# Patient Record
Sex: FEMALE | Race: BLACK OR AFRICAN AMERICAN | NOT HISPANIC OR LATINO | URBAN - METROPOLITAN AREA
[De-identification: names, ages, dates, MRNs, and addresses within clinical notes are randomized per-mention and may not be internally consistent; named-entity substitution may affect disease eponyms.]

---

## 2021-08-12 ENCOUNTER — EMERGENCY (EMERGENCY)
Facility: HOSPITAL | Age: 71
LOS: 1 days | Discharge: HOME | End: 2021-08-12
Attending: EMERGENCY MEDICINE | Admitting: EMERGENCY MEDICINE
Payer: MEDICARE

## 2021-08-12 VITALS
RESPIRATION RATE: 18 BRPM | HEART RATE: 70 BPM | OXYGEN SATURATION: 99 % | DIASTOLIC BLOOD PRESSURE: 77 MMHG | TEMPERATURE: 98 F | SYSTOLIC BLOOD PRESSURE: 130 MMHG

## 2021-08-12 VITALS
DIASTOLIC BLOOD PRESSURE: 74 MMHG | OXYGEN SATURATION: 98 % | RESPIRATION RATE: 18 BRPM | HEART RATE: 86 BPM | WEIGHT: 102.96 LBS | TEMPERATURE: 98 F | SYSTOLIC BLOOD PRESSURE: 131 MMHG

## 2021-08-12 DIAGNOSIS — R00.2 PALPITATIONS: ICD-10-CM

## 2021-08-12 DIAGNOSIS — R55 SYNCOPE AND COLLAPSE: ICD-10-CM

## 2021-08-12 DIAGNOSIS — Z85.3 PERSONAL HISTORY OF MALIGNANT NEOPLASM OF BREAST: ICD-10-CM

## 2021-08-12 DIAGNOSIS — E05.90 THYROTOXICOSIS, UNSPECIFIED WITHOUT THYROTOXIC CRISIS OR STORM: ICD-10-CM

## 2021-08-12 LAB
ALBUMIN SERPL ELPH-MCNC: 3.5 G/DL — SIGNIFICANT CHANGE UP (ref 3.4–5)
ALP SERPL-CCNC: 64 U/L — SIGNIFICANT CHANGE UP (ref 40–120)
ALT FLD-CCNC: 21 U/L — SIGNIFICANT CHANGE UP (ref 12–42)
ANION GAP SERPL CALC-SCNC: 7 MMOL/L — LOW (ref 9–16)
APPEARANCE UR: CLEAR — SIGNIFICANT CHANGE UP
AST SERPL-CCNC: 19 U/L — SIGNIFICANT CHANGE UP (ref 15–37)
BASOPHILS # BLD AUTO: 0.01 K/UL — SIGNIFICANT CHANGE UP (ref 0–0.2)
BASOPHILS NFR BLD AUTO: 0.2 % — SIGNIFICANT CHANGE UP (ref 0–2)
BILIRUB SERPL-MCNC: 0.2 MG/DL — SIGNIFICANT CHANGE UP (ref 0.2–1.2)
BILIRUB UR-MCNC: NEGATIVE — SIGNIFICANT CHANGE UP
BUN SERPL-MCNC: 19 MG/DL — SIGNIFICANT CHANGE UP (ref 7–23)
CALCIUM SERPL-MCNC: 9.2 MG/DL — SIGNIFICANT CHANGE UP (ref 8.5–10.5)
CHLORIDE SERPL-SCNC: 105 MMOL/L — SIGNIFICANT CHANGE UP (ref 96–108)
CO2 SERPL-SCNC: 28 MMOL/L — SIGNIFICANT CHANGE UP (ref 22–31)
COLOR SPEC: YELLOW — SIGNIFICANT CHANGE UP
CREAT SERPL-MCNC: 0.96 MG/DL — SIGNIFICANT CHANGE UP (ref 0.5–1.3)
DIFF PNL FLD: NEGATIVE — SIGNIFICANT CHANGE UP
EOSINOPHIL # BLD AUTO: 0.06 K/UL — SIGNIFICANT CHANGE UP (ref 0–0.5)
EOSINOPHIL NFR BLD AUTO: 1.1 % — SIGNIFICANT CHANGE UP (ref 0–6)
GLUCOSE SERPL-MCNC: 109 MG/DL — HIGH (ref 70–99)
GLUCOSE UR QL: NEGATIVE — SIGNIFICANT CHANGE UP
HCT VFR BLD CALC: 36.3 % — SIGNIFICANT CHANGE UP (ref 34.5–45)
HGB BLD-MCNC: 11.5 G/DL — SIGNIFICANT CHANGE UP (ref 11.5–15.5)
IMM GRANULOCYTES NFR BLD AUTO: 0.6 % — SIGNIFICANT CHANGE UP (ref 0–1.5)
KETONES UR-MCNC: NEGATIVE — SIGNIFICANT CHANGE UP
LEUKOCYTE ESTERASE UR-ACNC: NEGATIVE — SIGNIFICANT CHANGE UP
LYMPHOCYTES # BLD AUTO: 1.11 K/UL — SIGNIFICANT CHANGE UP (ref 1–3.3)
LYMPHOCYTES # BLD AUTO: 21.2 % — SIGNIFICANT CHANGE UP (ref 13–44)
MAGNESIUM SERPL-MCNC: 2.2 MG/DL — SIGNIFICANT CHANGE UP (ref 1.6–2.6)
MCHC RBC-ENTMCNC: 25.6 PG — LOW (ref 27–34)
MCHC RBC-ENTMCNC: 31.7 GM/DL — LOW (ref 32–36)
MCV RBC AUTO: 80.8 FL — SIGNIFICANT CHANGE UP (ref 80–100)
MONOCYTES # BLD AUTO: 0.7 K/UL — SIGNIFICANT CHANGE UP (ref 0–0.9)
MONOCYTES NFR BLD AUTO: 13.4 % — SIGNIFICANT CHANGE UP (ref 2–14)
NEUTROPHILS # BLD AUTO: 3.32 K/UL — SIGNIFICANT CHANGE UP (ref 1.8–7.4)
NEUTROPHILS NFR BLD AUTO: 63.5 % — SIGNIFICANT CHANGE UP (ref 43–77)
NITRITE UR-MCNC: NEGATIVE — SIGNIFICANT CHANGE UP
NRBC # BLD: 0 /100 WBCS — SIGNIFICANT CHANGE UP (ref 0–0)
PH UR: 5.5 — SIGNIFICANT CHANGE UP (ref 5–8)
PHOSPHATE SERPL-MCNC: 3.5 MG/DL — SIGNIFICANT CHANGE UP (ref 2.5–4.5)
PLATELET # BLD AUTO: 194 K/UL — SIGNIFICANT CHANGE UP (ref 150–400)
POTASSIUM SERPL-MCNC: 4.3 MMOL/L — SIGNIFICANT CHANGE UP (ref 3.5–5.3)
POTASSIUM SERPL-SCNC: 4.3 MMOL/L — SIGNIFICANT CHANGE UP (ref 3.5–5.3)
PROT SERPL-MCNC: 8.1 G/DL — SIGNIFICANT CHANGE UP (ref 6.4–8.2)
PROT UR-MCNC: NEGATIVE MG/DL — SIGNIFICANT CHANGE UP
RBC # BLD: 4.49 M/UL — SIGNIFICANT CHANGE UP (ref 3.8–5.2)
RBC # FLD: 15.5 % — HIGH (ref 10.3–14.5)
SODIUM SERPL-SCNC: 140 MMOL/L — SIGNIFICANT CHANGE UP (ref 132–145)
SP GR SPEC: <=1.005 — SIGNIFICANT CHANGE UP (ref 1–1.03)
TSH SERPL-MCNC: 0.15 UIU/ML — LOW (ref 0.36–3.74)
UROBILINOGEN FLD QL: 0.2 E.U./DL — SIGNIFICANT CHANGE UP
WBC # BLD: 5.23 K/UL — SIGNIFICANT CHANGE UP (ref 3.8–10.5)
WBC # FLD AUTO: 5.23 K/UL — SIGNIFICANT CHANGE UP (ref 3.8–10.5)

## 2021-08-12 PROCEDURE — 99284 EMERGENCY DEPT VISIT MOD MDM: CPT

## 2021-08-12 PROCEDURE — 93010 ELECTROCARDIOGRAM REPORT: CPT

## 2021-08-12 RX ORDER — SODIUM CHLORIDE 9 MG/ML
1000 INJECTION INTRAMUSCULAR; INTRAVENOUS; SUBCUTANEOUS ONCE
Refills: 0 | Status: COMPLETED | OUTPATIENT
Start: 2021-08-12 | End: 2021-08-12

## 2021-08-12 RX ADMIN — SODIUM CHLORIDE 1000 MILLILITER(S): 9 INJECTION INTRAMUSCULAR; INTRAVENOUS; SUBCUTANEOUS at 17:58

## 2021-08-12 NOTE — ED PROVIDER NOTE - WET READ LAUNCH FT
Returned call to pt. Pt received her EOB from her insurance and has not yet received the actual bill. She was not concerned with the charge but was upset that she was tested for illicit substances that she does not use. I explained to pt that this was the policy of the pain clinic that all new pts are tested and it was nothing personal.     There are no Wet Read(s) to document.

## 2021-08-12 NOTE — ED PROVIDER NOTE - NS ED ROS FT
GENERAL: No fever or chills  EYES: no change in vision  HEENT: no trouble swallowing or speaking  CARDIAC: no chest pain. + palpitations.   PULMONARY: no cough or SOB  GI: no abdominal pain, nausea, vomiting, diarrhea, or constipation   : No changes in urination  SKIN: no rashes  NEURO: no headache, numbness, or weakness. Presyncope.   MSK: No joint pain

## 2021-08-12 NOTE — ED PROVIDER NOTE - PATIENT PORTAL LINK FT
You can access the FollowMyHealth Patient Portal offered by [facility] by registering at the following website: [email or web address]. By joining [employer]’s FollowMyHealth portal, you will also be able to view your health information using other applications (apps) compatible with our system.

## 2021-08-12 NOTE — ED PROVIDER NOTE - PROGRESS NOTE DETAILS
TSH consistent with hyperthyroidism. Patient agrees to follow up with PCP. Also has an appointment with neurology on August 27th.

## 2021-08-12 NOTE — ED PROVIDER NOTE - CLINICAL SUMMARY MEDICAL DECISION MAKING FREE TEXT BOX
70 yo F with hx of breast cancer in 2008, resolved, RA presents with weakness, fatigue, pre-syncope and palpitations worsening for the last 2 months. VS WNL. EKG WNL. Normal physical exam. Possibly vasovagal pre-syncope / orthostatic hypotension. Less likely thyroid related. Do not suspect ACS given lack of chest pain /sob. Low heart score. Will obtain  basic labs, UA, TSH, and reassess. 70 yo F with hx of breast cancer in 2008, resolved, RA presents with weakness, fatigue, pre-syncope and palpitations worsening for the last 2 months. VS WNL. EKG WNL. Normal physical exam. Possibly vasovagal pre-syncope / orthostatic hypotension vs medication side effect. Less likely thyroid related. Do not suspect ACS given lack of chest pain /sob. Low heart score. Will obtain  basic labs, UA, TSH, and reassess.

## 2021-08-12 NOTE — ED ADULT TRIAGE NOTE - CHIEF COMPLAINT QUOTE
pt had an episode of palpitations, near syncope and general malaise - h/o Rheumatoid arthritis, breast ca

## 2021-08-12 NOTE — ED ADULT NURSE NOTE - NSIMPLEMENTINTERV_GEN_ALL_ED
Implemented All Universal Safety Interventions:  [place] to call system. Call bell, personal items and telephone within reach. Instruct patient to call for assistance. Room bathroom lighting operational. Non-slip footwear when patient is off stretcher. Physically safe environment: no spills, clutter or unnecessary equipment. Stretcher in lowest position, wheels locked, appropriate side rails in place.

## 2021-08-12 NOTE — ED PROVIDER NOTE - NSFOLLOWUPINSTRUCTIONS_ED_ALL_ED_FT
You were seen in the ED for lightheadedness, found to have signs of hyperthyroidism.   The following labs/imaging were obtained: see attached (if applicable)  Continue home medications (if any).   Stay well hydrated.   Return to the ED if you develop chest pain, shortness of breath, worsening or new concerning symptoms.  Follow up with your primary care in 2-3 days and discuss thyroid labs (See attached). Follow up with your neurologist on 08/27th.   Discussed with pt results of work up, strict return precautions, and need for follow up.  Pt expressed understanding and agrees with plan.

## 2021-08-12 NOTE — ED PROVIDER NOTE - ATTENDING CONTRIBUTION TO CARE
71 year old female came to the ED because of pre-syncope and palpitations on and off for 3 years, but worse in the last 3 months, has an upcoming neuro appointment. Normal neuro exam. Low TSH, but patient thinks she had abnormal thyroid test in past, that normalized on repeat testing. Improved with fluids, will dc to follow up with pmd. Precautions reviewed.

## 2021-08-12 NOTE — ED ADULT NURSE NOTE - OBJECTIVE STATEMENT
Pt came in c/o of intermittent palpitations, near syncope/fatigue x2018 with another bout occurring today. Pt reports fainting in 2018 and since then has become anxious that the is going to synopsize. PMH of left sided breat ca in remission since 2008. Denies fever,chills, sob, cp, n/v/d, headache/dizziness. A&Ox3 speaking in full sentences

## 2021-08-19 PROBLEM — Z78.9 OTHER SPECIFIED HEALTH STATUS: Chronic | Status: ACTIVE | Noted: 2021-08-12

## 2021-09-02 ENCOUNTER — APPOINTMENT (OUTPATIENT)
Dept: INTERNAL MEDICINE | Facility: CLINIC | Age: 71
End: 2021-09-02
Payer: MEDICARE

## 2021-09-02 ENCOUNTER — NON-APPOINTMENT (OUTPATIENT)
Age: 71
End: 2021-09-02

## 2021-09-02 VITALS
WEIGHT: 112 LBS | SYSTOLIC BLOOD PRESSURE: 106 MMHG | HEIGHT: 62.99 IN | DIASTOLIC BLOOD PRESSURE: 70 MMHG | HEART RATE: 76 BPM | OXYGEN SATURATION: 98 % | BODY MASS INDEX: 19.84 KG/M2

## 2021-09-02 DIAGNOSIS — Z82.49 FAMILY HISTORY OF ISCHEMIC HEART DISEASE AND OTHER DISEASES OF THE CIRCULATORY SYSTEM: ICD-10-CM

## 2021-09-02 DIAGNOSIS — Z78.9 OTHER SPECIFIED HEALTH STATUS: ICD-10-CM

## 2021-09-02 DIAGNOSIS — Z85.3 PERSONAL HISTORY OF MALIGNANT NEOPLASM OF BREAST: ICD-10-CM

## 2021-09-02 PROCEDURE — 93000 ELECTROCARDIOGRAM COMPLETE: CPT

## 2021-09-02 PROCEDURE — 99204 OFFICE O/P NEW MOD 45 MIN: CPT | Mod: 25

## 2021-09-02 RX ORDER — POLYVINYL ALCOHOL 14 MG/ML
1.4 SOLUTION/ DROPS OPHTHALMIC
Refills: 0 | Status: ACTIVE | COMMUNITY

## 2021-09-02 RX ORDER — MULTIVIT-MIN/FOLIC/VIT K/LYCOP 400-300MCG
500 TABLET ORAL
Refills: 0 | Status: ACTIVE | COMMUNITY

## 2021-09-03 NOTE — REVIEW OF SYSTEMS
[Fatigue] : fatigue [Joint Pain] : joint pain [Fever] : no fever [Night Sweats] : no night sweats [Discharge] : no discharge [Vision Problems] : no vision problems [Earache] : no earache [Nasal Discharge] : no nasal discharge [Chest Pain] : no chest pain [Palpitations] : no palpitations [Shortness Of Breath] : no shortness of breath [Cough] : no cough [Nausea] : no nausea [Vomiting] : no vomiting [Dysuria] : no dysuria [Hematuria] : no hematuria [Muscle Pain] : no muscle pain [Itching] : no itching [Headache] : no headache [Memory Loss] : no memory loss [Easy Bleeding] : no easy bleeding [Easy Bruising] : no easy bruising

## 2021-09-03 NOTE — PLAN
[FreeTextEntry1] : 45 min spent on 72 yo PMX, current hx, reviewing ER chart and labs, exam, plan and mgt.

## 2021-09-03 NOTE — HISTORY OF PRESENT ILLNESS
[de-identified] : F/U ER.  Had abn thyroid labs in ER.  Also on Plaquenil and pt states its just for hand arthritis.\par Was put on Fosamax in July.  Started having weakness, jaw pain and previous NP stopped them.  No nausea, vomiting, diarrhea, and constipation. No chest pain or shortness of breath.\par

## 2021-09-03 NOTE — HEALTH RISK ASSESSMENT
[Very Good] : ~his/her~  mood as very good [No] : No [No falls in past year] : Patient reported no falls in the past year [0] : 2) Feeling down, depressed, or hopeless: Not at all (0) [PHQ-2 Negative - No further assessment needed] : PHQ-2 Negative - No further assessment needed [Patient reported mammogram was normal] : Patient reported mammogram was normal [Patient reported colonoscopy was normal] : Patient reported colonoscopy was normal [None] : None [Alone] : lives alone [Retired] : retired [Single] : single [# Of Children ___] : has [unfilled] children [Fully functional (bathing, dressing, toileting, transferring, walking, feeding)] : Fully functional (bathing, dressing, toileting, transferring, walking, feeding) [Fully functional (using the telephone, shopping, preparing meals, housekeeping, doing laundry, using] : Fully functional and needs no help or supervision to perform IADLs (using the telephone, shopping, preparing meals, housekeeping, doing laundry, using transportation, managing medications and managing finances) [] : No [Change in mental status noted] : No change in mental status noted [MammogramDate] : 12/20 [ColonoscopyDate] : 01/2019

## 2021-09-04 ENCOUNTER — TRANSCRIPTION ENCOUNTER (OUTPATIENT)
Age: 71
End: 2021-09-04

## 2021-09-07 LAB
ALBUMIN SERPL ELPH-MCNC: 4.1 G/DL
ALP BLD-CCNC: 64 U/L
ALT SERPL-CCNC: 13 U/L
ANA PAT FLD IF-IMP: ABNORMAL
ANA SER IF-ACNC: ABNORMAL
ANION GAP SERPL CALC-SCNC: 11 MMOL/L
APPEARANCE: CLEAR
AST SERPL-CCNC: 22 U/L
BASOPHILS # BLD AUTO: 0.02 K/UL
BASOPHILS NFR BLD AUTO: 0.4 %
BILIRUB SERPL-MCNC: 0.2 MG/DL
BILIRUBIN URINE: NEGATIVE
BLOOD URINE: NEGATIVE
BUN SERPL-MCNC: 16 MG/DL
CALCIUM SERPL-MCNC: 9.6 MG/DL
CHLORIDE SERPL-SCNC: 103 MMOL/L
CHOLEST SERPL-MCNC: 184 MG/DL
CK SERPL-CCNC: 85 U/L
CO2 SERPL-SCNC: 27 MMOL/L
COLOR: NORMAL
CREAT SERPL-MCNC: 1.02 MG/DL
CRP SERPL-MCNC: <3 MG/L
EOSINOPHIL # BLD AUTO: 0.12 K/UL
EOSINOPHIL NFR BLD AUTO: 2.3 %
ERYTHROCYTE [SEDIMENTATION RATE] IN BLOOD BY WESTERGREN METHOD: 37 MM/HR
FERRITIN SERPL-MCNC: 75 NG/ML
FOLATE SERPL-MCNC: >20 NG/ML
GLUCOSE QUALITATIVE U: NEGATIVE
GLUCOSE SERPL-MCNC: 84 MG/DL
HCT VFR BLD CALC: 39.3 %
HDLC SERPL-MCNC: 65 MG/DL
HGB BLD-MCNC: 12.1 G/DL
IMM GRANULOCYTES NFR BLD AUTO: 0.4 %
KETONES URINE: NEGATIVE
LDLC SERPL CALC-MCNC: 107 MG/DL
LEUKOCYTE ESTERASE URINE: NEGATIVE
LYMPHOCYTES # BLD AUTO: 1.16 K/UL
LYMPHOCYTES NFR BLD AUTO: 22.6 %
MAN DIFF?: NORMAL
MCHC RBC-ENTMCNC: 26.1 PG
MCHC RBC-ENTMCNC: 30.8 GM/DL
MCV RBC AUTO: 84.7 FL
MONOCYTES # BLD AUTO: 0.51 K/UL
MONOCYTES NFR BLD AUTO: 9.9 %
NEUTROPHILS # BLD AUTO: 3.31 K/UL
NEUTROPHILS NFR BLD AUTO: 64.4 %
NITRITE URINE: NEGATIVE
NONHDLC SERPL-MCNC: 119 MG/DL
PH URINE: 7.5
PLATELET # BLD AUTO: 196 K/UL
POTASSIUM SERPL-SCNC: 4.2 MMOL/L
PROT SERPL-MCNC: 7.3 G/DL
PROTEIN URINE: NORMAL
RBC # BLD: 4.64 M/UL
RBC # FLD: 15.6 %
RHEUMATOID FACT SER QL: <10 IU/ML
SODIUM SERPL-SCNC: 141 MMOL/L
SPECIFIC GRAVITY URINE: 1.02
T3 SERPL-MCNC: 64 NG/DL
T4 FREE SERPL-MCNC: 0.5 NG/DL
THYROGLOB AB SERPL-ACNC: 1897 IU/ML
THYROPEROXIDASE AB SERPL IA-ACNC: 3853 IU/ML
TRIGL SERPL-MCNC: 61 MG/DL
TSH SERPL-ACNC: 40.3 UIU/ML
TSI ACT/NOR SER: <0.1 IU/L
UROBILINOGEN URINE: NORMAL
WBC # FLD AUTO: 5.14 K/UL

## 2021-10-07 ENCOUNTER — APPOINTMENT (OUTPATIENT)
Dept: ENDOCRINOLOGY | Facility: CLINIC | Age: 71
End: 2021-10-07
Payer: MEDICARE

## 2021-10-07 VITALS
WEIGHT: 112 LBS | DIASTOLIC BLOOD PRESSURE: 72 MMHG | HEART RATE: 71 BPM | SYSTOLIC BLOOD PRESSURE: 111 MMHG | HEIGHT: 62.99 IN | TEMPERATURE: 97.6 F | OXYGEN SATURATION: 99 % | BODY MASS INDEX: 19.84 KG/M2

## 2021-10-07 PROCEDURE — 99204 OFFICE O/P NEW MOD 45 MIN: CPT

## 2021-10-08 LAB
25(OH)D3 SERPL-MCNC: 41.8 NG/ML
ALBUMIN SERPL ELPH-MCNC: 4.9 G/DL
CALCIUM SERPL-MCNC: 10.2 MG/DL
CALCIUM SERPL-MCNC: 10.2 MG/DL
MAGNESIUM SERPL-MCNC: 2.1 MG/DL
PARATHYROID HORMONE INTACT: 18 PG/ML
PHOSPHATE SERPL-MCNC: 4.2 MG/DL
T3 SERPL-MCNC: 75 NG/DL
T4 FREE SERPL-MCNC: 1.2 NG/DL
TSH SERPL-ACNC: 3.04 UIU/ML

## 2021-10-11 ENCOUNTER — RX RENEWAL (OUTPATIENT)
Age: 71
End: 2021-10-11

## 2021-10-15 ENCOUNTER — NON-APPOINTMENT (OUTPATIENT)
Age: 71
End: 2021-10-15

## 2021-10-18 ENCOUNTER — NON-APPOINTMENT (OUTPATIENT)
Age: 71
End: 2021-10-18

## 2021-10-21 ENCOUNTER — NON-APPOINTMENT (OUTPATIENT)
Age: 71
End: 2021-10-21

## 2021-10-23 ENCOUNTER — NON-APPOINTMENT (OUTPATIENT)
Age: 71
End: 2021-10-23

## 2021-10-24 ENCOUNTER — NON-APPOINTMENT (OUTPATIENT)
Age: 71
End: 2021-10-24

## 2021-10-28 ENCOUNTER — NON-APPOINTMENT (OUTPATIENT)
Age: 71
End: 2021-10-28

## 2021-10-28 ENCOUNTER — APPOINTMENT (OUTPATIENT)
Dept: OBGYN | Facility: CLINIC | Age: 71
End: 2021-10-28
Payer: MEDICARE

## 2021-10-28 VITALS
WEIGHT: 114 LBS | BODY MASS INDEX: 20.2 KG/M2 | HEIGHT: 63 IN | DIASTOLIC BLOOD PRESSURE: 90 MMHG | SYSTOLIC BLOOD PRESSURE: 120 MMHG

## 2021-10-28 DIAGNOSIS — Z01.419 ENCOUNTER FOR GYNECOLOGICAL EXAMINATION (GENERAL) (ROUTINE) W/OUT ABNORMAL FINDINGS: ICD-10-CM

## 2021-10-28 DIAGNOSIS — Z12.39 ENCOUNTER FOR OTHER SCREENING FOR MALIGNANT NEOPLASM OF BREAST: ICD-10-CM

## 2021-10-28 DIAGNOSIS — Z85.3 PERSONAL HISTORY OF MALIGNANT NEOPLASM OF BREAST: ICD-10-CM

## 2021-10-28 DIAGNOSIS — Z80.1 FAMILY HISTORY OF MALIGNANT NEOPLASM OF TRACHEA, BRONCHUS AND LUNG: ICD-10-CM

## 2021-10-28 PROCEDURE — 99397 PER PM REEVAL EST PAT 65+ YR: CPT

## 2021-10-28 NOTE — COUNSELING
[Vitamins/Supplements] : vitamins/supplements [Influenza Vaccine] : influenza vaccine [Medication Management] : medication management

## 2021-10-28 NOTE — HISTORY OF PRESENT ILLNESS
[Patient reported mammogram was normal] : Patient reported mammogram was normal [Patient reported PAP Smear was normal] : Patient reported PAP Smear was normal [Patient reported colonoscopy was normal] : Patient reported colonoscopy was normal [postmenopausal] : postmenopausal [N] : Patient is not sexually active [Y] : Positive pregnancy history [Menarche Age: ____] : age at menarche was [unfilled] [Post-Menopause, No Sxs] : post-menopausal, currently without symptoms [Menopause Age: ____] : age at menopause was [unfilled] [Previously active] : previously active [Men] : men [No] : Patient does not have concerns regarding sex [Patient refuses STI testing] : Patient refuses STI testing [TextBox_4] : 71 yr old female presents for Annual [employer]. Feeling well. Denies any GYN complaints today. Denies any PMB. Denies any hx of abnormal paps in the past. Patient has a hx of breast cancer in Left breast; 2008. Patient had a lumpectomy and completed radiation. \par Patient denies any family hx of breast ovarian or colon cancer.  [Mammogramdate] : 2020 [PapSmeardate] : 2019 [BoneDensityDate] : 4/2021 [TextBox_37] : osteoporosis [ColonoscopyDate] : 2019 [PGHxTotal] : 3 [PGHxAbortions] : 3

## 2021-10-28 NOTE — REVIEW OF SYSTEMS
[Joint Stiffness] : joint stiffness [Negative] : Breast [FreeTextEntry9] : arthritis ; follows endo and rheum [de-identified] : hypothyroidism; sees endocrine

## 2021-10-28 NOTE — PHYSICAL EXAM
[Appropriately responsive] : appropriately responsive [Alert] : alert [No Acute Distress] : no acute distress [No Lymphadenopathy] : no lymphadenopathy [Soft] : soft [Non-tender] : non-tender [Non-distended] : non-distended [No Lesions] : no lesions [No Mass] : no mass [Oriented x3] : oriented x3 [Vulvar Atrophy] : vulvar atrophy [Labia Majora] : normal [Labia Minora] : normal [Atrophy] : atrophy [Uterine Adnexae] : normal [Declined] : Patient declined rectal exam [Examination Of The Breasts] : a normal appearance [Normal] : normal [No Masses] : no breast masses were palpable

## 2021-10-28 NOTE — PLAN
[FreeTextEntry1] : Pap performed as per patient request\par Patient to have previous GYN records sent to office for review\par Discussed vaginal atrophy. Patient declines any medicinal intervention at this time. \par Rx given for Mammo/Sono of breasts and TVS. \par

## 2021-11-01 LAB — HPV HIGH+LOW RISK DNA PNL CVX: NOT DETECTED

## 2021-11-02 LAB — CYTOLOGY CVX/VAG DOC THIN PREP: ABNORMAL

## 2021-11-18 ENCOUNTER — APPOINTMENT (OUTPATIENT)
Dept: ENDOCRINOLOGY | Facility: CLINIC | Age: 71
End: 2021-11-18
Payer: MEDICARE

## 2021-11-18 VITALS
BODY MASS INDEX: 20.2 KG/M2 | HEART RATE: 78 BPM | SYSTOLIC BLOOD PRESSURE: 134 MMHG | WEIGHT: 114 LBS | OXYGEN SATURATION: 98 % | TEMPERATURE: 97 F | DIASTOLIC BLOOD PRESSURE: 75 MMHG | HEIGHT: 63 IN

## 2021-11-18 DIAGNOSIS — M19.049 PRIMARY OSTEOARTHRITIS, UNSPECIFIED HAND: ICD-10-CM

## 2021-11-18 PROCEDURE — 99214 OFFICE O/P EST MOD 30 MIN: CPT

## 2021-11-18 RX ADMIN — ZOLEDRONIC ACID 0 MG/100ML: 5 INJECTION, SOLUTION INTRAVENOUS at 00:00

## 2021-11-19 LAB
25(OH)D3 SERPL-MCNC: 43.4 NG/ML
ALBUMIN SERPL ELPH-MCNC: 4.4 G/DL
CALCIUM SERPL-MCNC: 9.2 MG/DL
CREAT SERPL-MCNC: 0.86 MG/DL
TSH SERPL-ACNC: 1.58 UIU/ML

## 2021-11-22 ENCOUNTER — NON-APPOINTMENT (OUTPATIENT)
Age: 71
End: 2021-11-22

## 2021-12-07 ENCOUNTER — NON-APPOINTMENT (OUTPATIENT)
Age: 71
End: 2021-12-07

## 2021-12-07 ENCOUNTER — APPOINTMENT (OUTPATIENT)
Dept: RHEUMATOLOGY | Facility: CLINIC | Age: 71
End: 2021-12-07
Payer: MEDICARE

## 2021-12-07 VITALS
WEIGHT: 114 LBS | HEART RATE: 65 BPM | SYSTOLIC BLOOD PRESSURE: 112 MMHG | OXYGEN SATURATION: 100 % | TEMPERATURE: 98.1 F | BODY MASS INDEX: 20.2 KG/M2 | DIASTOLIC BLOOD PRESSURE: 73 MMHG | HEIGHT: 63 IN

## 2021-12-07 PROCEDURE — 99204 OFFICE O/P NEW MOD 45 MIN: CPT

## 2021-12-07 NOTE — HISTORY OF PRESENT ILLNESS
[FreeTextEntry1] : 71 year old woman referred to establish rheumatology care.\par \par Patient with onset of hand pain in May 2021, following with Dr. Shiloh Morejon at [place]. \par Patient initially with bilateral hand pain.  Presented with swelling of the bilateral hands, hard to make a fist, hard to open jars or open her door with key.  \par Taking hydroxychloroquine since August 2021 with good response, no pain at present\par Patient also with dry eyes\par \par Patient walks for exercise on track\par Also walks doing errands\par Rec center near home just joined in hopes of using the bicycle\par History of osteoporosis, started on ibandronate, had side effects of jaw pain, discontinues, had xray of the mandible 8/2021 which was negative \par Evaluated by endocrinology, planning for reclast when insurance approves\par No history of previous fracture\par \par Father with arthritis, RA\par No family history of hip fracture\par \par No photosensitivity\par Uses artificial tears four times per day. Follows with ophthalmologist\par Follows with retina doctor, was seen by retina specialist\par No rash \par \par Review of labs on patient's phone\par LEIGHTON +\par SSa+\par RF negative\par CCP negative\par \par May 2021 \par ESR 74\par CRP 4.26\par \par July 2021\par CRP 50\par ESR 87\par \par September 2021\par ESR 37,\par CRP<0.3

## 2021-12-07 NOTE — REVIEW OF SYSTEMS
[Dry Eyes] : dryness of the eyes [Negative] : Heme/Lymph [FreeTextEntry3] : uses artificial tears four times per day in each eye [FreeTextEntry9] : No joint pains at present

## 2021-12-07 NOTE — ASSESSMENT
[FreeTextEntry1] : Inflammatory polyarthritis predominantly of the hands, nonerosive, noted to have positive LEIGHTON, Positive SSa, negative RF, negative CCP.  Patient also with dry eyes, followed closely by ophthalmologist, treated with artificial tears in eye eye four times per day with benefit, possible early Sjogren's syndrome.  Patient with good response to plaquenil 200 mg daily, as pain and swelling resolved and inflammatory markers much improved, now taking once per day and will continue at this this dose. Opthalmology for retinal monitoring up to date.  Osteoporosis management as per endocrine, will increase weight bearing exercises, continue calcium and vitamin D supplementation, and fall precautions discussed. Will follow up in three months or sooner if symptoms change or worsen.

## 2021-12-07 NOTE — PHYSICAL EXAM
[General Appearance - Alert] : alert [General Appearance - In No Acute Distress] : in no acute distress [General Appearance - Well-Appearing] : healthy appearing [Sclera] : the sclera and conjunctiva were normal [Examination Of The Oral Cavity] : the lips and gums were normal [Oropharynx] : the oropharynx was normal [Respiration, Rhythm And Depth] : normal respiratory rhythm and effort [Exaggerated Use Of Accessory Muscles For Inspiration] : no accessory muscle use [No Spinal Tenderness] : no spinal tenderness [Abnormal Walk] : normal gait [Nail Clubbing] : no clubbing  or cyanosis of the fingernails [Musculoskeletal - Swelling] : no joint swelling seen [] : no rash [Oriented To Time, Place, And Person] : oriented to person, place, and time [Impaired Insight] : insight and judgment were intact [Affect] : the affect was normal [FreeTextEntry1] : moist mucous membranes

## 2021-12-07 NOTE — DATA REVIEWED
[FreeTextEntry1] : DEXA 4/2021\par Lumbar spine R score -3.3\par femoral neck: -1.3\par \par Dexa 2016\par lumbar spine -1.8\par femoral neck -1.2

## 2021-12-15 ENCOUNTER — TRANSCRIPTION ENCOUNTER (OUTPATIENT)
Age: 71
End: 2021-12-15

## 2021-12-22 ENCOUNTER — NON-APPOINTMENT (OUTPATIENT)
Age: 71
End: 2021-12-22

## 2022-01-06 ENCOUNTER — APPOINTMENT (OUTPATIENT)
Dept: RHEUMATOLOGY | Facility: CLINIC | Age: 72
End: 2022-01-06
Payer: MEDICARE

## 2022-01-06 VITALS
TEMPERATURE: 98.1 F | OXYGEN SATURATION: 99 % | HEART RATE: 64 BPM | SYSTOLIC BLOOD PRESSURE: 114 MMHG | DIASTOLIC BLOOD PRESSURE: 73 MMHG | RESPIRATION RATE: 14 BRPM

## 2022-01-06 VITALS
TEMPERATURE: 98.1 F | HEART RATE: 67 BPM | OXYGEN SATURATION: 99 % | SYSTOLIC BLOOD PRESSURE: 123 MMHG | RESPIRATION RATE: 14 BRPM | DIASTOLIC BLOOD PRESSURE: 77 MMHG

## 2022-01-06 PROCEDURE — 96374 THER/PROPH/DIAG INJ IV PUSH: CPT

## 2022-01-06 RX ORDER — ZOLEDRONIC ACID 5 MG/100ML
5 INJECTION INTRAVENOUS
Qty: 0 | Refills: 0 | Status: COMPLETED | OUTPATIENT
Start: 2021-11-18

## 2022-01-18 ENCOUNTER — APPOINTMENT (OUTPATIENT)
Dept: DERMATOLOGY | Facility: CLINIC | Age: 72
End: 2022-01-18
Payer: MEDICARE

## 2022-01-18 ENCOUNTER — NON-APPOINTMENT (OUTPATIENT)
Age: 72
End: 2022-01-18

## 2022-01-18 DIAGNOSIS — L81.0 POSTINFLAMMATORY HYPERPIGMENTATION: ICD-10-CM

## 2022-01-18 PROCEDURE — 99203 OFFICE O/P NEW LOW 30 MIN: CPT

## 2022-02-17 ENCOUNTER — APPOINTMENT (OUTPATIENT)
Dept: ENDOCRINOLOGY | Facility: CLINIC | Age: 72
End: 2022-02-17
Payer: MEDICARE

## 2022-02-17 VITALS
SYSTOLIC BLOOD PRESSURE: 109 MMHG | BODY MASS INDEX: 20.2 KG/M2 | DIASTOLIC BLOOD PRESSURE: 73 MMHG | HEIGHT: 63 IN | OXYGEN SATURATION: 100 % | HEART RATE: 75 BPM | TEMPERATURE: 97.9 F | WEIGHT: 114 LBS

## 2022-02-17 PROCEDURE — 99214 OFFICE O/P EST MOD 30 MIN: CPT

## 2022-02-18 LAB
25(OH)D3 SERPL-MCNC: 43.2 NG/ML
ALBUMIN SERPL ELPH-MCNC: 4.4 G/DL
CALCIUM SERPL-MCNC: 9.4 MG/DL
TSH SERPL-ACNC: 1.35 UIU/ML

## 2022-02-18 RX ORDER — LEVOTHYROXINE SODIUM 50 UG/1
50 TABLET ORAL
Qty: 30 | Refills: 1 | Status: COMPLETED | COMMUNITY
Start: 2021-09-07 | End: 2022-02-18

## 2022-02-25 ENCOUNTER — NON-APPOINTMENT (OUTPATIENT)
Age: 72
End: 2022-02-25

## 2022-03-07 ENCOUNTER — APPOINTMENT (OUTPATIENT)
Dept: RHEUMATOLOGY | Facility: CLINIC | Age: 72
End: 2022-03-07
Payer: MEDICARE

## 2022-03-07 VITALS
HEIGHT: 63 IN | DIASTOLIC BLOOD PRESSURE: 61 MMHG | HEART RATE: 80 BPM | WEIGHT: 113 LBS | SYSTOLIC BLOOD PRESSURE: 97 MMHG | TEMPERATURE: 97.9 F | OXYGEN SATURATION: 98 % | BODY MASS INDEX: 20.02 KG/M2

## 2022-03-07 PROCEDURE — 99213 OFFICE O/P EST LOW 20 MIN: CPT | Mod: 25

## 2022-03-07 NOTE — HISTORY OF PRESENT ILLNESS
[FreeTextEntry1] : 72 year old woman returns for follow up rheumatology evaluation.\par \par December 7, 2021\par Patient with onset of hand pain in May 2021, following with Dr. Shiloh Morejon at [place]. \par Patient initially with bilateral hand pain.  Presented with swelling of the bilateral hands, hard to make a fist, hard to open jars or open her door with key.  \par Taking hydroxychloroquine since August 2021 with good response, no pain at present\par Patient also with dry eyes\par \par Patient walks for exercise on track\par Also walks doing errands\par Rec center near home just joined in hopes of using the bicycle\par History of osteoporosis, started on ibandronate, had side effects of jaw pain, discontinues, had xray of the mandible 8/2021 which was negative \par Evaluated by endocrinology, planning for reclast when insurance approves\par No history of previous fracture\par \par Father with arthritis, RA\par No family history of hip fracture\par \par No photosensitivity\par Uses artificial tears four times per day. Follows with ophthalmologist\par Follows with retina doctor, was seen by retina specialist\par No rash \par \par Review of labs on patient's phone\par LEIGHTON +\par SSa+\par RF negative\par CCP negative\par \par May 2021 \par ESR 74\par CRP 4.26\par \par July 2021\par CRP 50\par ESR 87\par \par September 2021\par ESR 37,\par CRP<0.3\par \par March 7, 2022\par Patient returns for follow up\par Spoke with patient 2/25/2022, patient was concerned about feeling tired and fatigue at that time, and was concerned about possible side effect to plaquenil\par Patient stopped taking medications 2/26/2022 and since that time, feels some improvement in dull headache and maybe some benefit in fatigue\par No pain in the hands, no stiffness or swelling recurred since stopping plaquenil, was taking once daily dosing.\par thyroid function in the normal range at this time.\par No chest pain or shortness of breath\par Does feel some dizziness which has not changed with holding plaquenil.\par

## 2022-03-07 NOTE — PHYSICAL EXAM
[General Appearance - Alert] : alert [General Appearance - In No Acute Distress] : in no acute distress [General Appearance - Well-Appearing] : healthy appearing [Sclera] : the sclera and conjunctiva were normal [] : no respiratory distress [Respiration, Rhythm And Depth] : normal respiratory rhythm and effort [Exaggerated Use Of Accessory Muscles For Inspiration] : no accessory muscle use [Edema] : there was no peripheral edema [Abnormal Walk] : normal gait [Nail Clubbing] : no clubbing  or cyanosis of the fingernails [Musculoskeletal - Swelling] : no joint swelling seen [Motor Tone] : muscle strength and tone were normal [Oriented To Time, Place, And Person] : oriented to person, place, and time [Impaired Insight] : insight and judgment were intact [Affect] : the affect was normal [FreeTextEntry1] : No active synovitis of the upper and lower extremities bilaterally.

## 2022-03-07 NOTE — ASSESSMENT
[FreeTextEntry1] : Inflammatory polyarthritis predominantly of the hands, nonerosive, noted to have positive LEIGHTON, Positive SSa, negative RF, negative CCP.  Patient also with dry eyes, followed closely by ophthalmologist, treated with artificial tears in eye eye four times per day with benefit, possible early Sjogren's syndrome.  Patient with good response to plaquenil 200 mg daily, as pain and swelling resolved and inflammatory markers much improved, recently held plaquenil due to patient's concern regarding possible side effects of fatigue. Some benefit in fatigue since stopping without increase in joint symptoms since that time. Will continue to hold for now, if symptoms recur, will discuss possible restarting every other day or starting alternative therapy such as methotrexate.  Ophthalmology for retinal monitoring up to date.  Osteoporosis management as per endocrine, s/p reclast in January 2022, will continue weight bearing exercises, continue calcium and vitamin D supplementation, and fall precautions discussed. Will follow up in one month to reassess of plaquenill.  WIll check CBC today for further evaluation.

## 2022-03-08 LAB
BASOPHILS # BLD AUTO: 0.02 K/UL
BASOPHILS NFR BLD AUTO: 0.3 %
EOSINOPHIL # BLD AUTO: 0.11 K/UL
EOSINOPHIL NFR BLD AUTO: 1.8 %
HCT VFR BLD CALC: 39 %
HGB BLD-MCNC: 12.1 G/DL
IMM GRANULOCYTES NFR BLD AUTO: 0.2 %
LYMPHOCYTES # BLD AUTO: 1.29 K/UL
LYMPHOCYTES NFR BLD AUTO: 21 %
MAN DIFF?: NORMAL
MCHC RBC-ENTMCNC: 25.7 PG
MCHC RBC-ENTMCNC: 31 GM/DL
MCV RBC AUTO: 83 FL
MONOCYTES # BLD AUTO: 0.58 K/UL
MONOCYTES NFR BLD AUTO: 9.5 %
NEUTROPHILS # BLD AUTO: 4.12 K/UL
NEUTROPHILS NFR BLD AUTO: 67.2 %
PLATELET # BLD AUTO: 167 K/UL
RBC # BLD: 4.7 M/UL
RBC # FLD: 14.8 %
WBC # FLD AUTO: 6.13 K/UL

## 2022-04-06 ENCOUNTER — LABORATORY RESULT (OUTPATIENT)
Age: 72
End: 2022-04-06

## 2022-04-06 ENCOUNTER — APPOINTMENT (OUTPATIENT)
Dept: RHEUMATOLOGY | Facility: CLINIC | Age: 72
End: 2022-04-06
Payer: MEDICARE

## 2022-04-06 VITALS
OXYGEN SATURATION: 96 % | DIASTOLIC BLOOD PRESSURE: 78 MMHG | BODY MASS INDEX: 20.38 KG/M2 | TEMPERATURE: 97.8 F | HEART RATE: 88 BPM | SYSTOLIC BLOOD PRESSURE: 127 MMHG | HEIGHT: 63 IN | WEIGHT: 115 LBS

## 2022-04-06 PROCEDURE — 99214 OFFICE O/P EST MOD 30 MIN: CPT | Mod: 25

## 2022-04-06 RX ORDER — HYDROXYCHLOROQUINE SULFATE 200 MG/1
200 TABLET, FILM COATED ORAL DAILY
Qty: 90 | Refills: 3 | Status: DISCONTINUED | COMMUNITY
End: 2022-04-06

## 2022-04-07 ENCOUNTER — APPOINTMENT (OUTPATIENT)
Dept: INTERNAL MEDICINE | Facility: CLINIC | Age: 72
End: 2022-04-07
Payer: MEDICARE

## 2022-04-07 ENCOUNTER — NON-APPOINTMENT (OUTPATIENT)
Age: 72
End: 2022-04-07

## 2022-04-07 VITALS
HEIGHT: 63 IN | BODY MASS INDEX: 20.02 KG/M2 | DIASTOLIC BLOOD PRESSURE: 71 MMHG | HEART RATE: 87 BPM | TEMPERATURE: 97.5 F | WEIGHT: 113 LBS | SYSTOLIC BLOOD PRESSURE: 108 MMHG | OXYGEN SATURATION: 98 %

## 2022-04-07 DIAGNOSIS — R42 DIZZINESS AND GIDDINESS: ICD-10-CM

## 2022-04-07 LAB
ALBUMIN SERPL ELPH-MCNC: 4.5 G/DL
ALP BLD-CCNC: 55 U/L
ALT SERPL-CCNC: 13 U/L
ANION GAP SERPL CALC-SCNC: 12 MMOL/L
AST SERPL-CCNC: 24 U/L
BILIRUB SERPL-MCNC: 0.4 MG/DL
BUN SERPL-MCNC: 18 MG/DL
CALCIUM SERPL-MCNC: 9.7 MG/DL
CHLORIDE SERPL-SCNC: 105 MMOL/L
CK SERPL-CCNC: 75 U/L
CO2 SERPL-SCNC: 26 MMOL/L
CREAT SERPL-MCNC: 0.84 MG/DL
CRP SERPL-MCNC: <3 MG/L
EGFR: 75 ML/MIN/1.73M2
GLUCOSE SERPL-MCNC: 88 MG/DL
POTASSIUM SERPL-SCNC: 4 MMOL/L
PROT SERPL-MCNC: 7.6 G/DL
SODIUM SERPL-SCNC: 142 MMOL/L

## 2022-04-07 PROCEDURE — 93000 ELECTROCARDIOGRAM COMPLETE: CPT

## 2022-04-07 PROCEDURE — 99214 OFFICE O/P EST MOD 30 MIN: CPT | Mod: 25

## 2022-04-07 NOTE — PHYSICAL EXAM
[General Appearance - Alert] : alert [General Appearance - In No Acute Distress] : in no acute distress [General Appearance - Well-Appearing] : healthy appearing [Sclera] : the sclera and conjunctiva were normal [Examination Of The Oral Cavity] : the lips and gums were normal [Oropharynx] : the oropharynx was normal [Respiration, Rhythm And Depth] : normal respiratory rhythm and effort [Exaggerated Use Of Accessory Muscles For Inspiration] : no accessory muscle use [Edema] : there was no peripheral edema [Abnormal Walk] : normal gait [Nail Clubbing] : no clubbing  or cyanosis of the fingernails [Musculoskeletal - Swelling] : no joint swelling seen [Motor Tone] : muscle strength and tone were normal [] : no rash [Oriented To Time, Place, And Person] : oriented to person, place, and time [Impaired Insight] : insight and judgment were intact [Affect] : the affect was normal [Mood] : the mood was normal [FreeTextEntry1] : No active synovitis of the upper and lower extremities bilaterally.

## 2022-04-07 NOTE — REVIEW OF SYSTEMS
[Feeling Poorly] : feeling poorly [Shortness Of Breath] : shortness of breath [Dizziness] : dizziness [Negative] : Heme/Lymph [Fainting] : no fainting

## 2022-04-07 NOTE — ASSESSMENT
[FreeTextEntry1] : Inflammatory polyarthritis predominantly of the hands, nonerosive, noted to have positive LEIGHTON, Positive SSa, negative RF, negative CCP.  Patient also with dry eyes, followed closely by ophthalmologist, treated with artificial tears in eye eye four times per day with benefit, possible early Sjogren's syndrome.  Patient with good response to plaquenil 200 mg daily, as pain and swelling resolved and inflammatory markers much improved, medications held due to patient's concern regarding possible side effects of fatigue. Some benefit in fatigue since stopping without increase in joint symptoms since that time. Will continue to hold for now, if symptoms recur, will discuss possible restarting every other day or starting alternative therapy such as methotrexate.  Ophthalmology for retinal monitoring up to date.  Osteoporosis management as per endocrine, s/p reclast in January 2022, patient concerned about possible side effects of reclast as has been feeling weakness and dizziness since the infusion.  Patient jemima see PMD tomorrow for further evaluation. Discussed possible cardiology evaluation as symptoms make have worsened with exercise recently, but patient would like to discuss with PMD first. Discussed continuing weight bearing exercises, continue calcium and vitamin D supplementation, and fall precautions discussed. Will follow up in two months to reassess off plaquenill.  Will check CMP, CRP, CK and SPEP prior to tomorrow's visit.

## 2022-04-07 NOTE — HISTORY OF PRESENT ILLNESS
[FreeTextEntry1] : 72 year old woman returns for follow up rheumatology evaluation.\par \par December 7, 2021\par Patient with onset of hand pain in May 2021, following with Dr. Shiloh Morejon at [place]. \par Patient initially with bilateral hand pain.  Presented with swelling of the bilateral hands, hard to make a fist, hard to open jars or open her door with key.  \par Taking hydroxychloroquine since August 2021 with good response, no pain at present\par Patient also with dry eyes\par \par Patient walks for exercise on track\par Also walks doing errands\par Rec center near home just joined in hopes of using the bicycle\par History of osteoporosis, started on ibandronate, had side effects of jaw pain, discontinues, had xray of the mandible 8/2021 which was negative \par Evaluated by endocrinology, planning for Reclast when insurance approves\par No history of previous fracture\par \par Father with arthritis, RA\par No family history of hip fracture\par \par No photosensitivity\par Uses artificial tears four times per day. Follows with ophthalmologist\par Follows with retina doctor, was seen by retina specialist\par No rash \par \par Review of labs on patient's phone\par LEIGHTON +\par SSa+\par RF negative\par CCP negative\par \par May 2021 \par ESR 74\par CRP 4.26\par \par July 2021\par CRP 50\par ESR 87\par \par September 2021\par ESR 37,\par CRP<0.3\par \par March 7, 2022\par Patient returns for follow up\par Spoke with patient 2/25/2022, patient was concerned about feeling tired and fatigue at that time, and was concerned about ppossible side effect to plaquenil\par Patient stopped taking medications 2/26/2022 and since that time, feels some improvement in dull headache and maybe some benefit in fatigue\par No pain in the hands, no stiffness or swelling recurred since stopping plaquenil, was taking once daily dosing.\par thyroid function in the normal range at this time.\par No chest pain or shortness of breath\par Does feel some dizziness which has not changed with holding plaquenil.\par \par April 6, 2022\par Patient returns for follow up\par Patient has continued off plaquenil without an increase in joint pains or stiffness\par No pain or swelling at present, previously present in the bilateral hands\par Patient wishes to continue off plaquenil at this time.\par Main concern for patient related to feelings of dizziness and weakness\par Feels may be related to reclast infusion in January 2022\par Reviewed side effects information this morning regarding reclast and feels her symptoms correspond to these symptoms\par Sometimes feels as though may faint or pass out, but able to sit or use an umbrella for balance\par Feels stays well hydrated\par Taking multivitamin and vitamin C\par No changes in thyroid medicine\par Otherwise feeling well\par Has appointment with PMD tomorrow\par Discussed possible cardiology evaluation as also occurred after exercising

## 2022-04-11 LAB
ALBUMIN MFR SERPL ELPH: 56.2 %
ALBUMIN SERPL-MCNC: 4.3 G/DL
ALBUMIN/GLOB SERPL: 1.3 RATIO
ALPHA1 GLOB MFR SERPL ELPH: 3.5 %
ALPHA1 GLOB SERPL ELPH-MCNC: 0.3 G/DL
ALPHA2 GLOB MFR SERPL ELPH: 10.4 %
ALPHA2 GLOB SERPL ELPH-MCNC: 0.8 G/DL
B-GLOBULIN MFR SERPL ELPH: 11.8 %
B-GLOBULIN SERPL ELPH-MCNC: 0.9 G/DL
GAMMA GLOB FLD ELPH-MCNC: 1.4 G/DL
GAMMA GLOB MFR SERPL ELPH: 18.1 %
INTERPRETATION SERPL IEP-IMP: NORMAL
PROT SERPL-MCNC: 7.6 G/DL
PROT SERPL-MCNC: 7.6 G/DL

## 2022-04-11 NOTE — HISTORY OF PRESENT ILLNESS
[FreeTextEntry1] : lightheaded [de-identified] : Has been sluggish and lightheadedness.  Feels weaker dante getting up from chair.  No CP/SOB/HAINES.  No fever.  Pt has been fatigued and taking a vacation from [place] x 1 month.  No change in fatigue.  S/P Reclast 1/6/22.

## 2022-04-11 NOTE — PHYSICAL EXAM
[No Acute Distress] : no acute distress [Normal Sclera/Conjunctiva] : normal sclera/conjunctiva [EOMI] : extraocular movements intact [No JVD] : no jugular venous distention [No Lymphadenopathy] : no lymphadenopathy [Supple] : supple [No Respiratory Distress] : no respiratory distress  [Clear to Auscultation] : lungs were clear to auscultation bilaterally [Normal S1, S2] : normal S1 and S2 [No Murmur] : no murmur heard [No Carotid Bruits] : no carotid bruits [No Abdominal Bruit] : a ~M bruit was not heard ~T in the abdomen [Soft] : abdomen soft [Non Tender] : non-tender [Coordination Grossly Intact] : coordination grossly intact [Normal Gait] : normal gait [Deep Tendon Reflexes (DTR)] : deep tendon reflexes were 2+ and symmetric [Speech Grossly Normal] : speech grossly normal [Normal Mood] : the mood was normal [de-identified] : lying 116/66  78  Standing  102/60  88

## 2022-04-20 ENCOUNTER — NON-APPOINTMENT (OUTPATIENT)
Age: 72
End: 2022-04-20

## 2022-04-20 ENCOUNTER — APPOINTMENT (OUTPATIENT)
Dept: INTERNAL MEDICINE | Facility: CLINIC | Age: 72
End: 2022-04-20
Payer: MEDICARE

## 2022-04-20 VITALS
HEIGHT: 63 IN | DIASTOLIC BLOOD PRESSURE: 78 MMHG | BODY MASS INDEX: 20.2 KG/M2 | SYSTOLIC BLOOD PRESSURE: 124 MMHG | HEART RATE: 82 BPM | OXYGEN SATURATION: 97 % | WEIGHT: 114 LBS | TEMPERATURE: 97.6 F

## 2022-04-20 DIAGNOSIS — R53.83 OTHER MALAISE: ICD-10-CM

## 2022-04-20 DIAGNOSIS — R53.81 OTHER MALAISE: ICD-10-CM

## 2022-04-20 PROCEDURE — 93000 ELECTROCARDIOGRAM COMPLETE: CPT

## 2022-04-20 PROCEDURE — 99214 OFFICE O/P EST MOD 30 MIN: CPT | Mod: 25

## 2022-04-22 NOTE — REVIEW OF SYSTEMS
[Fever] : no fever [Night Sweats] : no night sweats [Earache] : no earache [Nasal Discharge] : no nasal discharge [Chest Pain] : no chest pain [Palpitations] : no palpitations [Shortness Of Breath] : no shortness of breath [Cough] : no cough [Nausea] : no nausea [Vomiting] : no vomiting

## 2022-04-22 NOTE — HISTORY OF PRESENT ILLNESS
[FreeTextEntry1] : lightheaded [de-identified] : Pt with symptoms when going from lying or sitting to standing position.  Has been taking BP in both positions at home and mostly dropping BP.  Then feels weak and lightheaded.  No nausea, vomiting, diarrhea, and constipation. No chest pain or shortness of breath.\par

## 2022-04-22 NOTE — PHYSICAL EXAM
[No Acute Distress] : no acute distress [Well Nourished] : well nourished [No Lymphadenopathy] : no lymphadenopathy [Supple] : supple [No Respiratory Distress] : no respiratory distress  [Clear to Auscultation] : lungs were clear to auscultation bilaterally [Regular Rhythm] : with a regular rhythm [No Murmur] : no murmur heard [Soft] : abdomen soft [Non Tender] : non-tender [Coordination Grossly Intact] : coordination grossly intact [No Focal Deficits] : no focal deficits [Speech Grossly Normal] : speech grossly normal [Normal Mood] : the mood was normal [de-identified] : 118/72 lying and 124/76 standing

## 2022-05-03 ENCOUNTER — RESULT REVIEW (OUTPATIENT)
Age: 72
End: 2022-05-03

## 2022-05-03 ENCOUNTER — OUTPATIENT (OUTPATIENT)
Dept: OUTPATIENT SERVICES | Facility: HOSPITAL | Age: 72
LOS: 1 days | End: 2022-05-03

## 2022-05-03 ENCOUNTER — APPOINTMENT (OUTPATIENT)
Dept: CT IMAGING | Facility: CLINIC | Age: 72
End: 2022-05-03
Payer: MEDICARE

## 2022-05-03 PROCEDURE — 70450 CT HEAD/BRAIN W/O DYE: CPT | Mod: 26

## 2022-05-04 ENCOUNTER — APPOINTMENT (OUTPATIENT)
Dept: INTERNAL MEDICINE | Facility: CLINIC | Age: 72
End: 2022-05-04
Payer: MEDICARE

## 2022-05-04 VITALS
WEIGHT: 116 LBS | SYSTOLIC BLOOD PRESSURE: 133 MMHG | HEART RATE: 78 BPM | HEIGHT: 63 IN | DIASTOLIC BLOOD PRESSURE: 75 MMHG | OXYGEN SATURATION: 99 % | BODY MASS INDEX: 20.55 KG/M2

## 2022-05-04 LAB
ALBUMIN SERPL ELPH-MCNC: 4.3 G/DL
ALP BLD-CCNC: 58 U/L
ALT SERPL-CCNC: 8 U/L
ANION GAP SERPL CALC-SCNC: 11 MMOL/L
APPEARANCE: CLEAR
AST SERPL-CCNC: 15 U/L
BACTERIA UR CULT: NORMAL
BASOPHILS # BLD AUTO: 0.02 K/UL
BASOPHILS NFR BLD AUTO: 0.4 %
BILIRUB SERPL-MCNC: 0.2 MG/DL
BILIRUBIN URINE: NEGATIVE
BLOOD URINE: NEGATIVE
BUN SERPL-MCNC: 16 MG/DL
CALCIUM SERPL-MCNC: 9.8 MG/DL
CHLORIDE SERPL-SCNC: 104 MMOL/L
CO2 SERPL-SCNC: 26 MMOL/L
COLOR: NORMAL
CREAT SERPL-MCNC: 0.82 MG/DL
EGFR: 77 ML/MIN/1.73M2
EOSINOPHIL # BLD AUTO: 0.08 K/UL
EOSINOPHIL NFR BLD AUTO: 1.6 %
ERYTHROCYTE [SEDIMENTATION RATE] IN BLOOD BY WESTERGREN METHOD: 96 MM/HR
FERRITIN SERPL-MCNC: 98 NG/ML
FOLATE SERPL-MCNC: >20 NG/ML
GLUCOSE QUALITATIVE U: NEGATIVE
GLUCOSE SERPL-MCNC: 82 MG/DL
HCT VFR BLD CALC: 37.9 %
HGB BLD-MCNC: 11.8 G/DL
IMM GRANULOCYTES NFR BLD AUTO: 0.2 %
KETONES URINE: NEGATIVE
LEUKOCYTE ESTERASE URINE: NEGATIVE
LYMPHOCYTES # BLD AUTO: 1.17 K/UL
LYMPHOCYTES NFR BLD AUTO: 23.6 %
MAN DIFF?: NORMAL
MCHC RBC-ENTMCNC: 26 PG
MCHC RBC-ENTMCNC: 31.1 GM/DL
MCV RBC AUTO: 83.7 FL
MONOCYTES # BLD AUTO: 0.48 K/UL
MONOCYTES NFR BLD AUTO: 9.7 %
NEUTROPHILS # BLD AUTO: 3.2 K/UL
NEUTROPHILS NFR BLD AUTO: 64.5 %
NITRITE URINE: NEGATIVE
PH URINE: 7
PLATELET # BLD AUTO: 178 K/UL
POTASSIUM SERPL-SCNC: 4.1 MMOL/L
PROT SERPL-MCNC: 7.7 G/DL
PROTEIN URINE: NEGATIVE
RBC # BLD: 4.53 M/UL
RBC # FLD: 14.6 %
SODIUM SERPL-SCNC: 141 MMOL/L
SPECIFIC GRAVITY URINE: 1.01
TSH SERPL-ACNC: 1.78 UIU/ML
UROBILINOGEN URINE: NORMAL
VIT B12 SERPL-MCNC: 1306 PG/ML
WBC # FLD AUTO: 4.96 K/UL

## 2022-05-04 PROCEDURE — 99214 OFFICE O/P EST MOD 30 MIN: CPT

## 2022-05-06 NOTE — PHYSICAL EXAM
[No Acute Distress] : no acute distress [Well Nourished] : well nourished [Normal Sclera/Conjunctiva] : normal sclera/conjunctiva [EOMI] : extraocular movements intact [No Lymphadenopathy] : no lymphadenopathy [Supple] : supple [No Respiratory Distress] : no respiratory distress  [Clear to Auscultation] : lungs were clear to auscultation bilaterally [Regular Rhythm] : with a regular rhythm [No Murmur] : no murmur heard [Soft] : abdomen soft [Non Tender] : non-tender [Coordination Grossly Intact] : coordination grossly intact [No Focal Deficits] : no focal deficits [Speech Grossly Normal] : speech grossly normal [Normal Mood] : the mood was normal

## 2022-05-06 NOTE — HISTORY OF PRESENT ILLNESS
[FreeTextEntry1] : Lightheadedness [de-identified] : CT head yesterday.  Slightly better.  Mildly better with BP.  Taking in more sodium.  No CP/SOB.  No N/V/D/C.  Pt stopped taking Plaquenil after having Fatigue.  ESR now up again.

## 2022-05-12 ENCOUNTER — APPOINTMENT (OUTPATIENT)
Dept: ENDOCRINOLOGY | Facility: CLINIC | Age: 72
End: 2022-05-12

## 2022-05-17 ENCOUNTER — TRANSCRIPTION ENCOUNTER (OUTPATIENT)
Age: 72
End: 2022-05-17

## 2022-05-17 ENCOUNTER — NON-APPOINTMENT (OUTPATIENT)
Age: 72
End: 2022-05-17

## 2022-06-01 ENCOUNTER — APPOINTMENT (OUTPATIENT)
Dept: INTERNAL MEDICINE | Facility: CLINIC | Age: 72
End: 2022-06-01
Payer: MEDICARE

## 2022-06-01 VITALS
WEIGHT: 115 LBS | DIASTOLIC BLOOD PRESSURE: 69 MMHG | HEART RATE: 84 BPM | HEIGHT: 63 IN | RESPIRATION RATE: 16 BRPM | TEMPERATURE: 97.3 F | OXYGEN SATURATION: 96 % | SYSTOLIC BLOOD PRESSURE: 116 MMHG | BODY MASS INDEX: 20.38 KG/M2

## 2022-06-01 PROCEDURE — 99214 OFFICE O/P EST MOD 30 MIN: CPT

## 2022-06-02 NOTE — REVIEW OF SYSTEMS
[Fever] : no fever [Night Sweats] : no night sweats [Chest Pain] : no chest pain [Palpitations] : no palpitations [Shortness Of Breath] : no shortness of breath [Cough] : no cough [Nausea] : no nausea [Vomiting] : no vomiting [Headache] : no headache [Memory Loss] : no memory loss

## 2022-06-02 NOTE — PHYSICAL EXAM
[No Acute Distress] : no acute distress [Well Nourished] : well nourished [No Lymphadenopathy] : no lymphadenopathy [Supple] : supple [No Respiratory Distress] : no respiratory distress  [Clear to Auscultation] : lungs were clear to auscultation bilaterally [Regular Rhythm] : with a regular rhythm [Normal S1, S2] : normal S1 and S2 [Soft] : abdomen soft [Non Tender] : non-tender [Coordination Grossly Intact] : coordination grossly intact [Normal Gait] : normal gait [Speech Grossly Normal] : speech grossly normal [Normal Mood] : the mood was normal

## 2022-06-02 NOTE — HISTORY OF PRESENT ILLNESS
[FreeTextEntry1] : Dizziness. [de-identified] : Dizziness is improving with extra Sodium and home remedy of a 2 tbsp lemon juice.  Also using decaf Green Tea when stressed and HR up.  Still getting up and down more slowly.  Overall feeling better.  No nausea, vomiting, diarrhea, and constipation. No chest pain or shortness of breath.\par

## 2022-06-08 ENCOUNTER — APPOINTMENT (OUTPATIENT)
Dept: RHEUMATOLOGY | Facility: CLINIC | Age: 72
End: 2022-06-08
Payer: MEDICARE

## 2022-06-08 VITALS
BODY MASS INDEX: 20.55 KG/M2 | HEART RATE: 90 BPM | WEIGHT: 116 LBS | OXYGEN SATURATION: 95 % | TEMPERATURE: 98 F | DIASTOLIC BLOOD PRESSURE: 64 MMHG | HEIGHT: 63 IN | SYSTOLIC BLOOD PRESSURE: 100 MMHG

## 2022-06-08 PROCEDURE — 99214 OFFICE O/P EST MOD 30 MIN: CPT

## 2022-06-08 NOTE — HISTORY OF PRESENT ILLNESS
[FreeTextEntry1] : 72 year old woman returns for follow up rheumatology evaluation.\par \par December 7, 2021\par Patient with onset of hand pain in May 2021, following with Dr. Shiloh Morejon at [place]. \par Patient initially with bilateral hand pain.  Presented with swelling of the bilateral hands, hard to make a fist, hard to open jars or open her door with key.  \par Taking hydroxychloroquine since August 2021 with good response, no pain at present\par Patient also with dry eyes\par \par Patient walks for exercise on track\par Also walks doing errands\par Rec center near home just joined in hopes of using the bicycle\par History of osteoporosis, started on ibandronate, had side effects of jaw pain, discontinues, had xray of the mandible 8/2021 which was negative \par Evaluated by endocrinology, planning for Reclast when insurance approves\par No history of previous fracture\par \par Father with arthritis, RA\par No family history of hip fracture\par \par No photosensitivity\par Uses artificial tears four times per day. Follows with ophthalmologist\par Follows with retina doctor, was seen by retina specialist\par No rash \par \par Review of labs on patient's phone\par LEIGHTON +\par SSa+\par RF negative\par CCP negative\par \par May 2021 \par ESR 74\par CRP 4.26\par \par July 2021\par CRP 50\par ESR 87\par \par September 2021\par ESR 37,\par CRP<0.3\par \par March 7, 2022\par Patient returns for follow up\par Spoke with patient 2/25/2022, patient was concerned about feeling tired and fatigue at that time, and was concerned about possible side effect to plaquenil\par Patient stopped taking medications 2/26/2022 and since that time, feels some improvement in dull headache and maybe some benefit in fatigue\par No pain in the hands, no stiffness or swelling recurred since stopping plaquenil, was taking once daily dosing.\par thyroid function in the normal range at this time.\par No chest pain or shortness of breath\par Does feel some dizziness which has not changed with holding plaquenil.\par \par April 6, 2022\par Patient returns for follow up\par Patient has continued off plaquenil without an increase in joint pains or stiffness\par No pain or swelling at present, previously present in the bilateral hands\par Patient wishes to continue off plaquenil at this time.\par Main concern for patient related to feelings of dizziness and weakness\par Feels may be related to reclast infusion in January 2022\par Reviewed side effects information this morning regarding reclast and feels her symptoms correspond to these symptoms\par Sometimes feels as though may faint or pass out, but able to sit or use an umbrella for balance\par Feels stays well hydrated\par Taking multivitamin and vitamin C\par No changes in thyroid medicine\par Otherwise feeling well\par Has appointment with PMD tomorrow\par Discussed possible cardiology evaluation as also occurred after exercising\par \par June 8, 2022\par Patient returns for follow up\par Feeling better in terms of dizziness since last visit although still present\par Feels benefit from decaf green tea before bed, has been drinking for his week with good response\par Has some increase in joint pain and stiffness, noted to have elevated ESR by PMD\par Was seen by opthalmology recently as well\par Using lidocaine patches for pain, previously tried voltaren gel and di not feel benefit\par Discussed trial of plaquenil 200 mg every other day for oow and if develops fatigue again, will discontinue\par

## 2022-06-08 NOTE — ASSESSMENT
[FreeTextEntry1] : Inflammatory polyarthritis predominantly of the hands, nonerosive, noted to have positive LEIGHTON, Positive SSa, negative RF, negative CCP.  Patient also with dry eyes, followed closely by ophthalmologist, treated with artificial tears in eye eye four times per day with benefit, possible early Sjogren's syndrome.  Patient with good response to plaquenil 200 mg daily, as pain and swelling resolved and inflammatory markers much improved, medications held due to patient's concern regarding possible side effects of fatigue. Some benefit in fatigue since stopping however now with increase in symptoms and ESR elevation.  Discussed trial of restarting plaquenil 200 mg every other day for now, with goal to increase to daily dosing as tolerated.  Ophthalmology for retinal monitoring up to date.  Osteoporosis management as per endocrine, s/p reclast in January 2022, patient concerned about possible side effects of reclast as has been feeling weakness and dizziness since the infusion, slowly improving. Discussed continuing weight bearing exercises, continue calcium and vitamin D supplementation, and fall precautions discussed. Will follow up in six weeks to reassess after restarting plaquenill.

## 2022-06-08 NOTE — PHYSICAL EXAM
[General Appearance - Alert] : alert [General Appearance - In No Acute Distress] : in no acute distress [General Appearance - Well-Appearing] : healthy appearing [Sclera] : the sclera and conjunctiva were normal [Respiration, Rhythm And Depth] : normal respiratory rhythm and effort [Exaggerated Use Of Accessory Muscles For Inspiration] : no accessory muscle use [Edema] : there was no peripheral edema [Abnormal Walk] : normal gait [Nail Clubbing] : no clubbing  or cyanosis of the fingernails [Musculoskeletal - Swelling] : no joint swelling seen [] : no rash [Oriented To Time, Place, And Person] : oriented to person, place, and time [Impaired Insight] : insight and judgment were intact [Affect] : the affect was normal [FreeTextEntry1] : Pain with handgrip bilaterally.  Some discomfort with range of motion of the shoulders

## 2022-06-08 NOTE — REVIEW OF SYSTEMS
[Joint Pain] : joint pain [Joint Stiffness] : joint stiffness [Dizziness] : dizziness [Negative] : Heme/Lymph [Joint Swelling] : no joint swelling [de-identified] : improving

## 2022-06-10 ENCOUNTER — NON-APPOINTMENT (OUTPATIENT)
Age: 72
End: 2022-06-10

## 2022-07-14 ENCOUNTER — APPOINTMENT (OUTPATIENT)
Dept: ENDOCRINOLOGY | Facility: CLINIC | Age: 72
End: 2022-07-14

## 2022-07-14 VITALS
WEIGHT: 114 LBS | SYSTOLIC BLOOD PRESSURE: 115 MMHG | TEMPERATURE: 97.6 F | OXYGEN SATURATION: 98 % | BODY MASS INDEX: 20.2 KG/M2 | DIASTOLIC BLOOD PRESSURE: 73 MMHG | HEIGHT: 63 IN | HEART RATE: 87 BPM

## 2022-07-14 PROCEDURE — 99214 OFFICE O/P EST MOD 30 MIN: CPT

## 2022-07-15 LAB
25(OH)D3 SERPL-MCNC: 47.5 NG/ML
ALBUMIN SERPL ELPH-MCNC: 4.7 G/DL
CALCIUM SERPL-MCNC: 9.9 MG/DL
CREAT SERPL-MCNC: 0.93 MG/DL
CRP SERPL-MCNC: <3 MG/L
EGFR: 66 ML/MIN/1.73M2
PHOSPHATE SERPL-MCNC: 3.3 MG/DL
TSH SERPL-ACNC: 0.95 UIU/ML

## 2022-07-18 ENCOUNTER — NON-APPOINTMENT (OUTPATIENT)
Age: 72
End: 2022-07-18

## 2022-07-20 ENCOUNTER — APPOINTMENT (OUTPATIENT)
Dept: RHEUMATOLOGY | Facility: CLINIC | Age: 72
End: 2022-07-20

## 2022-07-20 PROCEDURE — 99441: CPT

## 2022-07-20 NOTE — ASSESSMENT
[FreeTextEntry1] : Inflammatory polyarthritis predominantly of the hands, nonerosive, noted to have positive LEIGHTON, Positive SSa, negative RF, negative CCP.  Patient also with dry eyes, followed closely by ophthalmologist, treated with artificial tears in eye eye four times per day with benefit, possible early Sjogren's syndrome.  Since last visit patient has restarted hydroxychloroquine 200 mg, four days/week with good response in terms of joint pain and fatigue.  Patient does not report any side effects at this time. Ophthalmology for retinal monitoring up to date. Osteoporosis management as per endocrine, s/p reclast in January 2022, patient concerned about possible side effects of reclast as has been feeling weakness and dizziness since the infusion, slowly improving.  Patient also following with endocrine for hypothyroidism, recently adjusted levothyroxine dose.  Discussed continuing weight bearing exercises, continue calcium and vitamin D supplementation, and fall precautions discussed. Will follow up in 3-4 months or sooner as needed.
Yes

## 2022-07-20 NOTE — HISTORY OF PRESENT ILLNESS
[FreeTextEntry1] : 72 year old woman returns for follow up rheumatology evaluation.\par \par December 7, 2021\par Patient with onset of hand pain in May 2021, following with Dr. Shiloh Morejon at [place]. \par Patient initially with bilateral hand pain.  Presented with swelling of the bilateral hands, hard to make a fist, hard to open jars or open her door with key.  \par Taking hydroxychloroquine since August 2021 with good response, no pain at present\par Patient also with dry eyes\par \par Patient walks for exercise on track\par Also walks doing errands\par Rec center near home just joined in hopes of using the bicycle\par History of osteoporosis, started on ibandronate, had side effects of jaw pain, discontinues, had xray of the mandible 8/2021 which was negative \par Evaluated by endocrinology, planning for Reclast when insurance approves\par No history of previous fracture\par \par Father with arthritis, RA\par No family history of hip fracture\par \par No photosensitivity\par Uses artificial tears four times per day. Follows with ophthalmologist\par Follows with retina doctor, was seen by retina specialist\par No rash \par \par Review of labs on patient's phone\par LEIGHTON +\par SSa+\par RF negative\par CCP negative\par \par May 2021 \par ESR 74\par CRP 4.26\par \par July 2021\par CRP 50\par ESR 87\par \par September 2021\par ESR 37,\par CRP<0.3\par \par March 7, 2022\par Patient returns for follow up\par Spoke with patient 2/25/2022, patient was concerned about feeling tired and fatigue at that time, and was concerned about possible side effect to plaquenil\par Patient stopped taking medications 2/26/2022 and since that time, feels some improvement in dull headache and maybe some benefit in fatigue\par No pain in the hands, no stiffness or swelling recurred since stopping plaquenil, was taking once daily dosing.\par thyroid function in the normal range at this time.\par No chest pain or shortness of breath\par Does feel some dizziness which has not changed with holding plaquenil.\par \par April 6, 2022\par Patient returns for follow up\par Patient has continued off plaquenil without an increase in joint pains or stiffness\par No pain or swelling at present, previously present in the bilateral hands\par Patient wishes to continue off plaquenil at this time.\par Main concern for patient related to feelings of dizziness and weakness\par Feels may be related to reclast infusion in January 2022\par Reviewed side effects information this morning regarding reclast and feels her symptoms correspond to these symptoms\par Sometimes feels as though may faint or pass out, but able to sit or use an umbrella for balance\par Feels stays well hydrated\par Taking multivitamin and vitamin C\par No changes in thyroid medicine\par Otherwise feeling well\par Has appointment with PMD tomorrow\par Discussed possible cardiology evaluation as also occurred after exercising\par \par June 8, 2022\par Patient returns for follow up\par Feeling better in terms of dizziness since last visit although still present\par Feels benefit from decaf green tea before bed, has been drinking for his week with good response\par Has some increase in joint pain and stiffness, noted to have elevated ESR by PMD\par Was seen by opthalmology recently as well\par Using lidocaine patches for pain, previously tried voltaren gel and di not feel benefit\par Discussed trial of plaquenil 200 mg every other day for now and if develops fatigue again, will discontinue\par \par July 20, 2022\par Patient returns for follow up via telehealth visit.\par Discussed with patient.  You have chosen to receive care through the use of tele-media.  Telemedia enables health care providers at different locations to provide safe, effective, and convenient care through the use of technology.  Please note this is a billable encounter.  Patient understands that I cannot perform a physical exam and that patient may need to come to the clinic to complete the assessment.  Patient agreed verbally to understanding the risks and benefits of telemedia as explained.\par \par Patient has been taking plaquenil about four times per week since last visit.\par "So far so good"\par Feels less foggy than before and feels less pain in the fingers\par No stiffness in the hands noted, maybe one finger intermittently \par Better than before\par Next ophthalmology appointment scheduled for 12/13/202\par \par PMD appointmetn 12/5\par

## 2022-07-22 ENCOUNTER — EMERGENCY (EMERGENCY)
Facility: HOSPITAL | Age: 72
LOS: 1 days | Discharge: HOME | End: 2022-07-22
Attending: EMERGENCY MEDICINE | Admitting: EMERGENCY MEDICINE

## 2022-07-22 VITALS
HEART RATE: 81 BPM | RESPIRATION RATE: 17 BRPM | SYSTOLIC BLOOD PRESSURE: 126 MMHG | TEMPERATURE: 98 F | OXYGEN SATURATION: 97 % | DIASTOLIC BLOOD PRESSURE: 76 MMHG

## 2022-07-22 LAB
ALBUMIN SERPL ELPH-MCNC: 3.6 G/DL — SIGNIFICANT CHANGE UP (ref 3.4–5)
ALP SERPL-CCNC: 58 U/L — SIGNIFICANT CHANGE UP (ref 40–120)
ALT FLD-CCNC: 20 U/L — SIGNIFICANT CHANGE UP (ref 12–42)
ANION GAP SERPL CALC-SCNC: 8 MMOL/L — LOW (ref 9–16)
AST SERPL-CCNC: 22 U/L — SIGNIFICANT CHANGE UP (ref 15–37)
BASOPHILS # BLD AUTO: 0.01 K/UL — SIGNIFICANT CHANGE UP (ref 0–0.2)
BASOPHILS NFR BLD AUTO: 0.2 % — SIGNIFICANT CHANGE UP (ref 0–2)
BILIRUB SERPL-MCNC: 0.3 MG/DL — SIGNIFICANT CHANGE UP (ref 0.2–1.2)
BUN SERPL-MCNC: 15 MG/DL — SIGNIFICANT CHANGE UP (ref 7–23)
CALCIUM SERPL-MCNC: 9 MG/DL — SIGNIFICANT CHANGE UP (ref 8.5–10.5)
CHLORIDE SERPL-SCNC: 107 MMOL/L — SIGNIFICANT CHANGE UP (ref 96–108)
CO2 SERPL-SCNC: 27 MMOL/L — SIGNIFICANT CHANGE UP (ref 22–31)
CREAT SERPL-MCNC: 0.97 MG/DL — SIGNIFICANT CHANGE UP (ref 0.5–1.3)
EGFR: 63 ML/MIN/1.73M2 — SIGNIFICANT CHANGE UP
EOSINOPHIL # BLD AUTO: 0.1 K/UL — SIGNIFICANT CHANGE UP (ref 0–0.5)
EOSINOPHIL NFR BLD AUTO: 2 % — SIGNIFICANT CHANGE UP (ref 0–6)
GLUCOSE SERPL-MCNC: 94 MG/DL — SIGNIFICANT CHANGE UP (ref 70–99)
HCT VFR BLD CALC: 35.5 % — SIGNIFICANT CHANGE UP (ref 34.5–45)
HGB BLD-MCNC: 11.4 G/DL — LOW (ref 11.5–15.5)
IMM GRANULOCYTES NFR BLD AUTO: 0.2 % — SIGNIFICANT CHANGE UP (ref 0–1.5)
LYMPHOCYTES # BLD AUTO: 1.28 K/UL — SIGNIFICANT CHANGE UP (ref 1–3.3)
LYMPHOCYTES # BLD AUTO: 25.8 % — SIGNIFICANT CHANGE UP (ref 13–44)
MCHC RBC-ENTMCNC: 26.3 PG — LOW (ref 27–34)
MCHC RBC-ENTMCNC: 32.1 GM/DL — SIGNIFICANT CHANGE UP (ref 32–36)
MCV RBC AUTO: 82 FL — SIGNIFICANT CHANGE UP (ref 80–100)
MONOCYTES # BLD AUTO: 0.63 K/UL — SIGNIFICANT CHANGE UP (ref 0–0.9)
MONOCYTES NFR BLD AUTO: 12.7 % — SIGNIFICANT CHANGE UP (ref 2–14)
NEUTROPHILS # BLD AUTO: 2.94 K/UL — SIGNIFICANT CHANGE UP (ref 1.8–7.4)
NEUTROPHILS NFR BLD AUTO: 59.1 % — SIGNIFICANT CHANGE UP (ref 43–77)
NRBC # BLD: 0 /100 WBCS — SIGNIFICANT CHANGE UP (ref 0–0)
PLATELET # BLD AUTO: 154 K/UL — SIGNIFICANT CHANGE UP (ref 150–400)
POTASSIUM SERPL-MCNC: 3.9 MMOL/L — SIGNIFICANT CHANGE UP (ref 3.5–5.3)
POTASSIUM SERPL-SCNC: 3.9 MMOL/L — SIGNIFICANT CHANGE UP (ref 3.5–5.3)
PROT SERPL-MCNC: 8.1 G/DL — SIGNIFICANT CHANGE UP (ref 6.4–8.2)
RBC # BLD: 4.33 M/UL — SIGNIFICANT CHANGE UP (ref 3.8–5.2)
RBC # FLD: 14.2 % — SIGNIFICANT CHANGE UP (ref 10.3–14.5)
SODIUM SERPL-SCNC: 142 MMOL/L — SIGNIFICANT CHANGE UP (ref 132–145)
WBC # BLD: 4.97 K/UL — SIGNIFICANT CHANGE UP (ref 3.8–10.5)
WBC # FLD AUTO: 4.97 K/UL — SIGNIFICANT CHANGE UP (ref 3.8–10.5)

## 2022-07-22 PROCEDURE — 99284 EMERGENCY DEPT VISIT MOD MDM: CPT

## 2022-07-22 NOTE — ED PROVIDER NOTE - PATIENT PORTAL LINK FT
You can access the FollowMyHealth Patient Portal offered by [facility] by registering at the following website: [email or web address]. By joining [facility]’s FollowMyHealth portal, you will also be able to view your health information using other applications (apps) compatible with our system.

## 2022-07-22 NOTE — ED ADULT TRIAGE NOTE - CHIEF COMPLAINT QUOTE
Pt c/o hypertension x2 months. Pt states she has seen her PCP three times over the past month. Pt CP or dizziness.

## 2022-07-22 NOTE — ED PROVIDER NOTE - CARE PLAN
1 Principal Discharge DX:	History of chronic fatigue  Assessment and plan of treatment:	ddx includes metabolic derangement, dehydration, anemia amongst other considerations  will check basic labs  bp wnl in ED   Principal Discharge DX:	History of chronic fatigue  Assessment and plan of treatment:	ddx includes metabolic derangement, dehydration, anemia amongst other considerations  will check basic labs  bp wnl in ED    labs wnl   advise pmd f/u for chronic fatigue   emergent precautions noted - intractable cp, sob, difficulty breathing, lightheadedness/dizziness or other concerning symptoms

## 2022-07-22 NOTE — ED PROVIDER NOTE - OBJECTIVE STATEMENT
72F  pmh hypothyroid, RA  presents for concern of high bp on home reading  states home readings have been erratic over the last several months - sometimes hypertensive, sometimes hypotensive   c/o 5-6 months of general lethargy   has pmd f/u scheduled next week

## 2022-07-22 NOTE — ED PROVIDER NOTE - CLINICAL SUMMARY MEDICAL DECISION MAKING FREE TEXT BOX
ddx includes metabolic derangement, dehydration, anemia amongst other considerations  will check basic labs  bp wnl in ED ddx includes metabolic derangement, dehydration, anemia amongst other considerations  will check basic labs  bp wnl in ED    labs wnl   advise pmd f/u for chronic fatigue   emergent precautions noted - intractable cp, sob, difficulty breathing, lightheadedness/dizziness or other concerning symptoms

## 2022-07-25 DIAGNOSIS — I10 ESSENTIAL (PRIMARY) HYPERTENSION: ICD-10-CM

## 2022-07-25 DIAGNOSIS — R53.82 CHRONIC FATIGUE, UNSPECIFIED: ICD-10-CM

## 2022-08-09 ENCOUNTER — APPOINTMENT (OUTPATIENT)
Dept: INTERNAL MEDICINE | Facility: CLINIC | Age: 72
End: 2022-08-09

## 2022-08-09 VITALS
SYSTOLIC BLOOD PRESSURE: 105 MMHG | OXYGEN SATURATION: 98 % | HEART RATE: 80 BPM | WEIGHT: 116 LBS | BODY MASS INDEX: 20.55 KG/M2 | HEIGHT: 63 IN | TEMPERATURE: 97.5 F | DIASTOLIC BLOOD PRESSURE: 67 MMHG

## 2022-08-09 DIAGNOSIS — D50.9 IRON DEFICIENCY ANEMIA, UNSPECIFIED: ICD-10-CM

## 2022-08-09 PROCEDURE — 36415 COLL VENOUS BLD VENIPUNCTURE: CPT

## 2022-08-09 PROCEDURE — G0439: CPT

## 2022-08-11 NOTE — HEALTH RISK ASSESSMENT
[Good] : ~his/her~  mood as  good [No falls in past year] : Patient reported no falls in the past year [0] : 2) Feeling down, depressed, or hopeless: Not at all (0) [PHQ-2 Negative - No further assessment needed] : PHQ-2 Negative - No further assessment needed [None] : None [Retired] : retired [Fully functional (bathing, dressing, toileting, transferring, walking, feeding)] : Fully functional (bathing, dressing, toileting, transferring, walking, feeding) [Fully functional (using the telephone, shopping, preparing meals, housekeeping, doing laundry, using] : Fully functional and needs no help or supervision to perform IADLs (using the telephone, shopping, preparing meals, housekeeping, doing laundry, using transportation, managing medications and managing finances) [[id number]] : 0

## 2022-08-11 NOTE — REVIEW OF SYSTEMS
[Fever] : no fever [Night Sweats] : no night sweats [Earache] : no earache [Nasal Discharge] : no nasal discharge [Chest Pain] : no chest pain [Palpitations] : no palpitations [Shortness Of Breath] : no shortness of breath [Cough] : no cough [Nausea] : no nausea [Vomiting] : no vomiting [Dysuria] : no dysuria [Hematuria] : no hematuria

## 2022-08-12 ENCOUNTER — NON-APPOINTMENT (OUTPATIENT)
Age: 72
End: 2022-08-12

## 2022-08-12 LAB
ALBUMIN SERPL ELPH-MCNC: 4.4 G/DL
ALP BLD-CCNC: 60 U/L
ALT SERPL-CCNC: 17 U/L
ANION GAP SERPL CALC-SCNC: 12 MMOL/L
AST SERPL-CCNC: 22 U/L
BASOPHILS # BLD AUTO: 0.01 K/UL
BASOPHILS NFR BLD AUTO: 0.2 %
BILIRUB SERPL-MCNC: 0.2 MG/DL
BUN SERPL-MCNC: 21 MG/DL
CALCIUM SERPL-MCNC: 9.4 MG/DL
CHLORIDE SERPL-SCNC: 108 MMOL/L
CO2 SERPL-SCNC: 24 MMOL/L
CREAT SERPL-MCNC: 0.83 MG/DL
EGFR: 76 ML/MIN/1.73M2
EOSINOPHIL # BLD AUTO: 0.11 K/UL
EOSINOPHIL NFR BLD AUTO: 2.1 %
FOLATE SERPL-MCNC: >20 NG/ML
GLUCOSE SERPL-MCNC: 104 MG/DL
HCT VFR BLD CALC: 36.6 %
HGB BLD-MCNC: 11.5 G/DL
IMM GRANULOCYTES NFR BLD AUTO: 0.2 %
IRON SATN MFR SERPL: 22 %
IRON SERPL-MCNC: 57 UG/DL
LYMPHOCYTES # BLD AUTO: 1.11 K/UL
LYMPHOCYTES NFR BLD AUTO: 20.7 %
MAN DIFF?: NORMAL
MCHC RBC-ENTMCNC: 26.7 PG
MCHC RBC-ENTMCNC: 31.4 GM/DL
MCV RBC AUTO: 84.9 FL
MONOCYTES # BLD AUTO: 0.51 K/UL
MONOCYTES NFR BLD AUTO: 9.5 %
NEUTROPHILS # BLD AUTO: 3.6 K/UL
NEUTROPHILS NFR BLD AUTO: 67.3 %
PLATELET # BLD AUTO: 177 K/UL
POTASSIUM SERPL-SCNC: 4.2 MMOL/L
PROT SERPL-MCNC: 7.4 G/DL
RBC # BLD: 4.31 M/UL
RBC # FLD: 14.9 %
SODIUM SERPL-SCNC: 144 MMOL/L
TIBC SERPL-MCNC: 260 UG/DL
TSH SERPL-ACNC: 2.3 UIU/ML
UIBC SERPL-MCNC: 203 UG/DL
VIT B12 SERPL-MCNC: 1508 PG/ML
WBC # FLD AUTO: 5.35 K/UL

## 2022-08-19 ENCOUNTER — NON-APPOINTMENT (OUTPATIENT)
Age: 72
End: 2022-08-19

## 2022-08-19 LAB — HEMOCCULT STL QL IA: NEGATIVE

## 2022-09-06 ENCOUNTER — LABORATORY RESULT (OUTPATIENT)
Age: 72
End: 2022-09-06

## 2022-09-12 ENCOUNTER — TRANSCRIPTION ENCOUNTER (OUTPATIENT)
Age: 72
End: 2022-09-12

## 2022-09-13 ENCOUNTER — APPOINTMENT (OUTPATIENT)
Dept: DERMATOLOGY | Facility: CLINIC | Age: 72
End: 2022-09-13

## 2022-09-15 ENCOUNTER — APPOINTMENT (OUTPATIENT)
Dept: ENDOCRINOLOGY | Facility: CLINIC | Age: 72
End: 2022-09-15

## 2022-09-16 ENCOUNTER — NON-APPOINTMENT (OUTPATIENT)
Age: 72
End: 2022-09-16

## 2022-09-16 ENCOUNTER — APPOINTMENT (OUTPATIENT)
Dept: ENDOCRINOLOGY | Facility: CLINIC | Age: 72
End: 2022-09-16

## 2022-09-16 VITALS
DIASTOLIC BLOOD PRESSURE: 71 MMHG | RESPIRATION RATE: 14 BRPM | HEART RATE: 74 BPM | WEIGHT: 117 LBS | BODY MASS INDEX: 20.73 KG/M2 | SYSTOLIC BLOOD PRESSURE: 109 MMHG | HEIGHT: 63 IN | OXYGEN SATURATION: 97 %

## 2022-09-16 PROCEDURE — 99214 OFFICE O/P EST MOD 30 MIN: CPT

## 2022-09-30 ENCOUNTER — APPOINTMENT (OUTPATIENT)
Dept: DERMATOLOGY | Facility: CLINIC | Age: 72
End: 2022-09-30

## 2022-10-17 ENCOUNTER — NON-APPOINTMENT (OUTPATIENT)
Age: 72
End: 2022-10-17

## 2022-10-18 ENCOUNTER — TRANSCRIPTION ENCOUNTER (OUTPATIENT)
Age: 72
End: 2022-10-18

## 2022-10-20 ENCOUNTER — APPOINTMENT (OUTPATIENT)
Dept: RHEUMATOLOGY | Facility: CLINIC | Age: 72
End: 2022-10-20

## 2022-10-20 VITALS
DIASTOLIC BLOOD PRESSURE: 72 MMHG | SYSTOLIC BLOOD PRESSURE: 114 MMHG | HEART RATE: 79 BPM | HEIGHT: 63 IN | WEIGHT: 117 LBS | TEMPERATURE: 97.3 F | BODY MASS INDEX: 20.73 KG/M2 | OXYGEN SATURATION: 96 %

## 2022-10-20 PROCEDURE — 99214 OFFICE O/P EST MOD 30 MIN: CPT

## 2022-10-20 NOTE — ASSESSMENT
[FreeTextEntry1] : Inflammatory polyarthritis predominantly of the hands, nonerosive, noted to have positive LEIGHTON, Positive SSa, negative RF, negative CCP.  Patient previously taking hydroxychloroquine 200 mg 3 days a week on Monday, Wednesday, and  Fridays. Until last week developed change in vision and concerned about possible hydroxychloroquine as etiology. Patient was seen by retinologist on October 12, exam and testing as per patient negative, recommended neurology evaluation, follow-up on October 31 to further evaluate symptoms.  Patient will follow-up with retinologist on November 14. Given concern about possible hydroxychloroquine, will hold at this time, discussed alternative treatment options such as methotrexate however patient concerned about side effects particularly immunosuppressive effects. Will hold off on starting at this time, patient currently asymptomatic in terms of inflammatory arthritis symptoms, and will follow-up after retinologist appointment on November 14.  Discussed if symptoms may also be related to dry eyes, patient did not feel ophthalmologist suggested this as etiology at this time.  Continues artificial tears in the eye about four times per day with benefit. Osteoporosis management as per endocrine, s/p reclast in January 2022, patient concerned about possible side effects of reclast as has been feeling weakness and dizziness since the infusion, slowly improving.  Patient also following with endocrine for hypothyroidism, continues levothyroxine.  Patient will follow-up following retinologist appointment.

## 2022-10-20 NOTE — PHYSICAL EXAM
[General Appearance - In No Acute Distress] : in no acute distress [General Appearance - Alert] : alert [General Appearance - Well-Appearing] : healthy appearing [] : no respiratory distress [Respiration, Rhythm And Depth] : normal respiratory rhythm and effort [Exaggerated Use Of Accessory Muscles For Inspiration] : no accessory muscle use [Edema] : there was no peripheral edema [Abnormal Walk] : normal gait [Nail Clubbing] : no clubbing  or cyanosis of the fingernails [Musculoskeletal - Swelling] : no joint swelling seen [Motor Tone] : muscle strength and tone were normal [Oriented To Time, Place, And Person] : oriented to person, place, and time [Impaired Insight] : insight and judgment were intact [Affect] : the affect was normal [FreeTextEntry1] : No active synovitis of the upper and lower extremities bilaterally.

## 2022-10-20 NOTE — HISTORY OF PRESENT ILLNESS
[FreeTextEntry1] : 72 year old woman returns for follow up rheumatology evaluation.\par \par December 7, 2021\par Patient with onset of hand pain in May 2021, following with Dr. Shiloh Morejon at [facility]. \par Patient initially with bilateral hand pain.  Presented with swelling of the bilateral hands, hard to make a fist, hard to open jars or open her door with key.  \par Taking hydroxychloroquine since August 2021 with good response, no pain at present\par Patient also with dry eyes\par \par Patient walks for exercise on track\par Also walks doing errands\par Rec center near home just joined in hopes of using the bicycle\par History of osteoporosis, started on ibandronate, had side effects of jaw pain, discontinues, had xray of the mandible 8/2021 which was negative \par Evaluated by endocrinology, planning for Reclast when insurance approves\par No history of previous fracture\par \par Father with arthritis, RA\par No family history of hip fracture\par \par No photosensitivity\par Uses artificial tears four times per day. Follows with ophthalmologist\par Follows with retina doctor, was seen by retina specialist\par No rash \par \par Review of labs on patient's phone\par LEIGHTON +\par SSa+\par RF negative\par CCP negative\par \par May 2021 \par ESR 74\par CRP 4.26\par \par July 2021\par CRP 50\par ESR 87\par \par September 2021\par ESR 37,\par CRP<0.3\par \par March 7, 2022\par Patient returns for follow up\par Spoke with patient 2/25/2022, patient was concerned about feeling tired and fatigue at that time, and was concerned about possible side effect to plaquenil\par Patient stopped taking medications 2/26/2022 and since that time, feels some improvement in dull headache and maybe some benefit in fatigue\par No pain in the hands, no stiffness or swelling recurred since stopping plaquenil, was taking once daily dosing.\par thyroid function in the normal range at this time.\par No chest pain or shortness of breath\par Does feel some dizziness which has not changed with holding plaquenil.\par \par April 6, 2022\par Patient returns for follow up\par Patient has continued off plaquenil without an increase in joint pains or stiffness\par No pain or swelling at present, previously present in the bilateral hands\par Patient wishes to continue off plaquenil at this time.\par Main concern for patient related to feelings of dizziness and weakness\par Feels may be related to reclast infusion in January 2022\par Reviewed side effects information this morning regarding reclast and feels her symptoms correspond to these symptoms\par Sometimes feels as though may faint or pass out, but able to sit or use an umbrella for balance\par Feels stays well hydrated\par Taking multivitamin and vitamin C\par No changes in thyroid medicine\par Otherwise feeling well\par Has appointment with PMD tomorrow\par Discussed possible cardiology evaluation as also occurred after exercising\par \par June 8, 2022\par Patient returns for follow up\par Feeling better in terms of dizziness since last visit although still present\par Feels benefit from decaf green tea before bed, has been drinking for his week with good response\par Has some increase in joint pain and stiffness, noted to have elevated ESR by PMD\par Was seen by opthalmology recently as well\par Using lidocaine patches for pain, previously tried voltaren gel and di not feel benefit\par Discussed trial of plaquenil 200 mg every other day for now and if develops fatigue again, will discontinue\par \par July 20, 2022\par Patient returns for follow up via telehealth visit.\par Discussed with patient.  You have chosen to receive care through the use of tele-media.  Telemedia enables health care providers at different locations to provide safe, effective, and convenient care through the use of technology.  Please note this is a billable encounter.  Patient understands that I cannot perform a physical exam and that patient may need to come to the clinic to complete the assessment.  Patient agreed verbally to understanding the risks and benefits of telemedia as explained.\par \par Patient has been taking plaquenil about four times per week since last visit.\par "So far so good"\par Feels less foggy than before and feels less pain in the fingers\par No stiffness in the hands noted, maybe one finger intermittently \par Better than before\par Next ophthalmology appointment scheduled for 12/13/2022\par \par PMD appointment 12/5\par  \par October 20, 2022\par On October 11 patient noted a change in vision\par Felt a quarter size decrease in peripheral vision on the right side\par Was seen by retina specialist, completed multiple tests, all normal, no changes noted October 12\par Couple of days developed light sensitivity, wearing sunglasses outside\par Reviewed information regarding hydroxychloroquine\par Was taking Monday Wednesday and Friday 200 mg daily\par Concerned about side effect from\par Last dose yesterday\par Has appointment October 31 with neurologist\par And will follow-up with ophthalmologist on November 14\par No pain at present\par Felt some pain on the right lower back uses patches for pain relief\par Patient requesting new medicine for joint symptoms, very sensitive to medication side effect\par Discussed possible methotrexate 7.5 mg once per week\par Discussed side effect profile with patient\par No pain in the hands wrist shoulders at this time\par No morning stiffness or swelling at this time\par Patient concerned about fluctuations in glucose levels, requesting glucose monitor from primary care provider, in process of prior authorization

## 2022-10-31 ENCOUNTER — NON-APPOINTMENT (OUTPATIENT)
Age: 72
End: 2022-10-31

## 2022-10-31 ENCOUNTER — APPOINTMENT (OUTPATIENT)
Dept: OPHTHALMOLOGY | Facility: CLINIC | Age: 72
End: 2022-10-31

## 2022-10-31 PROCEDURE — 92134 CPTRZ OPH DX IMG PST SGM RTA: CPT

## 2022-10-31 PROCEDURE — 92083 EXTENDED VISUAL FIELD XM: CPT

## 2022-10-31 PROCEDURE — 92004 COMPRE OPH EXAM NEW PT 1/>: CPT

## 2022-11-15 ENCOUNTER — APPOINTMENT (OUTPATIENT)
Dept: INTERNAL MEDICINE | Facility: CLINIC | Age: 72
End: 2022-11-15

## 2022-11-15 VITALS
DIASTOLIC BLOOD PRESSURE: 73 MMHG | OXYGEN SATURATION: 97 % | HEIGHT: 63 IN | BODY MASS INDEX: 20.38 KG/M2 | SYSTOLIC BLOOD PRESSURE: 116 MMHG | TEMPERATURE: 97.5 F | HEART RATE: 75 BPM | WEIGHT: 115 LBS

## 2022-11-15 DIAGNOSIS — R21 RASH AND OTHER NONSPECIFIC SKIN ERUPTION: ICD-10-CM

## 2022-11-15 PROCEDURE — 36415 COLL VENOUS BLD VENIPUNCTURE: CPT

## 2022-11-15 PROCEDURE — 99214 OFFICE O/P EST MOD 30 MIN: CPT | Mod: 25

## 2022-11-15 NOTE — PHYSICAL EXAM
[No Acute Distress] : no acute distress [Well Nourished] : well nourished [No Respiratory Distress] : no respiratory distress  [Clear to Auscultation] : lungs were clear to auscultation bilaterally [Regular Rhythm] : with a regular rhythm [Normal S1, S2] : normal S1 and S2 [Soft] : abdomen soft [Non Tender] : non-tender [Coordination Grossly Intact] : coordination grossly intact [Normal Gait] : normal gait [Speech Grossly Normal] : speech grossly normal [Normal Mood] : the mood was normal

## 2022-11-17 ENCOUNTER — NON-APPOINTMENT (OUTPATIENT)
Age: 72
End: 2022-11-17

## 2022-11-17 NOTE — HISTORY OF PRESENT ILLNESS
[FreeTextEntry1] : RA and thyroid [de-identified] : Saw Neuro optho last mo and all is OK.  Also saw a retina specialists yesterday and tests all normal.  Will restart Plaquenil later this week.\par No nausea, vomiting, diarrhea, and constipation. No chest pain or shortness of breath.\par \par

## 2022-11-22 ENCOUNTER — NON-APPOINTMENT (OUTPATIENT)
Age: 72
End: 2022-11-22

## 2022-11-22 LAB
ALBUMIN SERPL ELPH-MCNC: 4.4 G/DL
ALP BLD-CCNC: 61 U/L
ALT SERPL-CCNC: 11 U/L
ANION GAP SERPL CALC-SCNC: 10 MMOL/L
AST SERPL-CCNC: 20 U/L
BASOPHILS # BLD AUTO: 0.01 K/UL
BASOPHILS NFR BLD AUTO: 0.2 %
BILIRUB SERPL-MCNC: 0.4 MG/DL
BUN SERPL-MCNC: 12 MG/DL
CALCIUM SERPL-MCNC: 9.5 MG/DL
CHLORIDE SERPL-SCNC: 103 MMOL/L
CO2 SERPL-SCNC: 27 MMOL/L
CREAT SERPL-MCNC: 0.85 MG/DL
CRP SERPL-MCNC: 5 MG/L
EGFR: 74 ML/MIN/1.73M2
EOSINOPHIL # BLD AUTO: 0.07 K/UL
EOSINOPHIL NFR BLD AUTO: 1.6 %
ERYTHROCYTE [SEDIMENTATION RATE] IN BLOOD BY WESTERGREN METHOD: 47 MM/HR
ESTIMATED AVERAGE GLUCOSE: 123 MG/DL
GLUCOSE SERPL-MCNC: 81 MG/DL
HBA1C MFR BLD HPLC: 5.9 %
HCT VFR BLD CALC: 37 %
HGB BLD-MCNC: 11.6 G/DL
IMM GRANULOCYTES NFR BLD AUTO: 0.2 %
LYMPHOCYTES # BLD AUTO: 1.08 K/UL
LYMPHOCYTES NFR BLD AUTO: 24.1 %
MAN DIFF?: NORMAL
MCHC RBC-ENTMCNC: 26.1 PG
MCHC RBC-ENTMCNC: 31.4 GM/DL
MCV RBC AUTO: 83.1 FL
MONOCYTES # BLD AUTO: 0.53 K/UL
MONOCYTES NFR BLD AUTO: 11.8 %
NEUTROPHILS # BLD AUTO: 2.79 K/UL
NEUTROPHILS NFR BLD AUTO: 62.1 %
PLATELET # BLD AUTO: 168 K/UL
POTASSIUM SERPL-SCNC: 4.1 MMOL/L
PROT SERPL-MCNC: 7.7 G/DL
RBC # BLD: 4.45 M/UL
RBC # FLD: 14.8 %
SODIUM SERPL-SCNC: 141 MMOL/L
WBC # FLD AUTO: 4.49 K/UL

## 2022-11-28 ENCOUNTER — APPOINTMENT (OUTPATIENT)
Dept: RHEUMATOLOGY | Facility: CLINIC | Age: 72
End: 2022-11-28

## 2023-01-03 ENCOUNTER — APPOINTMENT (OUTPATIENT)
Dept: RADIOLOGY | Facility: CLINIC | Age: 73
End: 2023-01-03
Payer: MEDICARE

## 2023-01-03 ENCOUNTER — RESULT REVIEW (OUTPATIENT)
Age: 73
End: 2023-01-03

## 2023-01-03 ENCOUNTER — OUTPATIENT (OUTPATIENT)
Dept: OUTPATIENT SERVICES | Facility: HOSPITAL | Age: 73
LOS: 1 days | End: 2023-01-03

## 2023-01-03 PROCEDURE — 77080 DXA BONE DENSITY AXIAL: CPT | Mod: 26

## 2023-01-06 ENCOUNTER — APPOINTMENT (OUTPATIENT)
Dept: RHEUMATOLOGY | Facility: CLINIC | Age: 73
End: 2023-01-06

## 2023-01-06 LAB
25(OH)D3 SERPL-MCNC: 42.9 NG/ML
ALBUMIN SERPL ELPH-MCNC: 4.4 G/DL
CALCIUM SERPL-MCNC: 9.4 MG/DL
CREAT SERPL-MCNC: 0.87 MG/DL
EGFR: 72 ML/MIN/1.73M2
PHOSPHATE SERPL-MCNC: 3.9 MG/DL
TSH SERPL-ACNC: 2.49 UIU/ML

## 2023-01-09 ENCOUNTER — APPOINTMENT (OUTPATIENT)
Dept: RHEUMATOLOGY | Facility: CLINIC | Age: 73
End: 2023-01-09
Payer: MEDICARE

## 2023-01-09 VITALS
OXYGEN SATURATION: 99 % | TEMPERATURE: 97.7 F | HEIGHT: 63 IN | DIASTOLIC BLOOD PRESSURE: 71 MMHG | BODY MASS INDEX: 20.02 KG/M2 | SYSTOLIC BLOOD PRESSURE: 110 MMHG | WEIGHT: 113 LBS | HEART RATE: 79 BPM

## 2023-01-09 PROCEDURE — 99214 OFFICE O/P EST MOD 30 MIN: CPT

## 2023-01-09 NOTE — HISTORY OF PRESENT ILLNESS
[FreeTextEntry1] : 72 year old woman returns for follow up rheumatology evaluation.\par \par December 7, 2021\par Patient with onset of hand pain in May 2021, following with Dr. Shiloh Morejon at [facility]. \par Patient initially with bilateral hand pain.  Presented with swelling of the bilateral hands, hard to make a fist, hard to open jars or open her door with key.  \par Taking hydroxychloroquine since August 2021 with good response, no pain at present\par Patient also with dry eyes\par \par Patient walks for exercise on track\par Also walks doing errands\par Rec center near home just joined in hopes of using the bicycle\par History of osteoporosis, started on ibandronate, had side effects of jaw pain, discontinues, had xray of the mandible 8/2021 which was negative \par Evaluated by endocrinology, planning for Reclast when insurance approves\par No history of previous fracture\par \par Father with arthritis, RA\par No family history of hip fracture\par \par No photosensitivity\par Uses artificial tears four times per day. Follows with ophthalmologist\par Follows with retina doctor, was seen by retina specialist\par No rash \par \par Review of labs on patient's phone\par LEIGHTON +\par SSa+\par RF negative\par CCP negative\par \par May 2021 \par ESR 74\par CRP 4.26\par \par July 2021\par CRP 50\par ESR 87\par \par September 2021\par ESR 37,\par CRP<0.3\par \par March 7, 2022\par Patient returns for follow up\par Spoke with patient 2/25/2022, patient was concerned about feeling tired and fatigue at that time, and was concerned about possible side effect to plaquenil\par Patient stopped taking medications 2/26/2022 and since that time, feels some improvement in dull headache and maybe some benefit in fatigue\par No pain in the hands, no stiffness or swelling recurred since stopping plaquenil, was taking once daily dosing.\par thyroid function in the normal range at this time.\par No chest pain or shortness of breath\par Does feel some dizziness which has not changed with holding plaquenil.\par \par April 6, 2022\par Patient returns for follow up\par Patient has continued off plaquenil without an increase in joint pains or stiffness\par No pain or swelling at present, previously present in the bilateral hands\par Patient wishes to continue off plaquenil at this time.\par Main concern for patient related to feelings of dizziness and weakness\par Feels may be related to reclast infusion in January 2022\par Reviewed side effects information this morning regarding reclast and feels her symptoms correspond to these symptoms\par Sometimes feels as though may faint or pass out, but able to sit or use an umbrella for balance\par Feels stays well hydrated\par Taking multivitamin and vitamin C\par No changes in thyroid medicine\par Otherwise feeling well\par Has appointment with PMD tomorrow\par Discussed possible cardiology evaluation as also occurred after exercising\par \par June 8, 2022\par Patient returns for follow up\par Feeling better in terms of dizziness since last visit although still present\par Feels benefit from decaf green tea before bed, has been drinking for his week with good response\par Has some increase in joint pain and stiffness, noted to have elevated ESR by PMD\par Was seen by opthalmology recently as well\par Using lidocaine patches for pain, previously tried voltaren gel and di not feel benefit\par Discussed trial of plaquenil 200 mg every other day for now and if develops fatigue again, will discontinue\par \par July 20, 2022\par Patient returns for follow up via telehealth visit.\par Discussed with patient.  You have chosen to receive care through the use of tele-media.  Telemedia enables health care providers at different locations to provide safe, effective, and convenient care through the use of technology.  Please note this is a billable encounter.  Patient understands that I cannot perform a physical exam and that patient may need to come to the clinic to complete the assessment.  Patient agreed verbally to understanding the risks and benefits of telemedia as explained.\par \par Patient has been taking plaquenil about four times per week since last visit.\par "So far so good"\par Feels less foggy than before and feels less pain in the fingers\par No stiffness in the hands noted, maybe one finger intermittently \par Better than before\par Next ophthalmology appointment scheduled for 12/13/2022\par \par PMD appointment 12/5\par  \par October 20, 2022\par On October 11 patient noted a change in vision\par Felt a quarter size decrease in peripheral vision on the right side\par Was seen by retina specialist, completed multiple tests, all normal, no changes noted October 12\par Couple of days developed light sensitivity, wearing sunglasses outside\par Reviewed information regarding hydroxychloroquine\par Was taking Monday Wednesday and Friday 200 mg daily\par Concerned about side effect from\par Last dose yesterday\par Has appointment October 31 with neurologist\par And will follow-up with ophthalmologist on November 14\par No pain at present\par Felt some pain on the right lower back uses patches for pain relief\par Patient requesting new medicine for joint symptoms, very sensitive to medication side effect\par Discussed possible methotrexate 7.5 mg once per week\par Discussed side effect profile with patient\par No pain in the hands wrist shoulders at this time\par No morning stiffness or swelling at this time\par Patient concerned about fluctuations in glucose levels, requesting glucose monitor from primary care provider, in process of prior authorization\par \par January 9, 2023\par Patient returns for follow-up, overall doing well\par Was seen by ophthalmologist October 31, 2022, no evidence of Plaquenil toxicity\par Restarted Plaquenil 200 mg three times per week following ophthalmology evaluation\par Feeling ok\par No joint pain at this time\par Hands are not swollen \par No morning stiffness\par Sometimes some stiffness in the back, feels improvement with menthol patches\par Repeat bone density January 3, 2023, t score -2.7 in the lumbar spine (improved from previous -3.3)

## 2023-01-09 NOTE — DATA REVIEWED
[FreeTextEntry1] : DEXA January 3, 2023\par Lumbar spine T score -2.7\par Left femoral neck T score -0.8\par Left total hip -1.1\par \par DEXA 4/2021\par Lumbar spine T score -3.3\par femoral neck: -1.3\par \par Dexa 2016\par lumbar spine -1.8\par femoral neck -1.2

## 2023-01-09 NOTE — PHYSICAL EXAM
[General Appearance - Alert] : alert [General Appearance - In No Acute Distress] : in no acute distress [General Appearance - Well-Appearing] : healthy appearing [Sclera] : the sclera and conjunctiva were normal [Examination Of The Oral Cavity] : the lips and gums were normal [Oropharynx] : the oropharynx was normal [Respiration, Rhythm And Depth] : normal respiratory rhythm and effort [Exaggerated Use Of Accessory Muscles For Inspiration] : no accessory muscle use [Edema] : there was no peripheral edema [Abnormal Walk] : normal gait [Nail Clubbing] : no clubbing  or cyanosis of the fingernails [Musculoskeletal - Swelling] : no joint swelling seen [Motor Tone] : muscle strength and tone were normal [] : no rash [Oriented To Time, Place, And Person] : oriented to person, place, and time [Impaired Insight] : insight and judgment were intact [Affect] : the affect was normal [FreeTextEntry1] : No active synovitis of the upper and lower extremities bilaterally.

## 2023-01-09 NOTE — ASSESSMENT
[FreeTextEntry1] : Inflammatory polyarthritis predominantly of the hands, nonerosive, noted to have positive LEIGHTON, Positive SSa, negative RF, negative CCP.  Since last visit, patient was seen by ophthalmologist on October 31, 2022, recommended okay to restart hydroxychloroquine, no evidence of hydroxychloroquine toxicity noted.  Since that time patient has resumed hydroxychloroquine 200 mg Monday, Wednesday, and Friday, tolerating well.  Patient currently feeling well, no active symptoms at this time.  Reviewed recent repeat bone density, has follow-up with endocrinology next week.  Improvement in the lumbar spine T score noted.  Reviewed recent labs with patient.  Continue calcium and vitamin D supplementation, continue weightbearing exercises, fall precautions discussed.  Patient will continue to follow-up with ophthalmologist as recommended.  Follow-up in 4 months or sooner as needed.\par \par

## 2023-01-19 ENCOUNTER — APPOINTMENT (OUTPATIENT)
Dept: ENDOCRINOLOGY | Facility: CLINIC | Age: 73
End: 2023-01-19
Payer: MEDICARE

## 2023-01-19 ENCOUNTER — NON-APPOINTMENT (OUTPATIENT)
Age: 73
End: 2023-01-19

## 2023-01-19 VITALS
BODY MASS INDEX: 20.55 KG/M2 | HEART RATE: 77 BPM | DIASTOLIC BLOOD PRESSURE: 62 MMHG | SYSTOLIC BLOOD PRESSURE: 99 MMHG | TEMPERATURE: 97.3 F | HEIGHT: 63 IN | OXYGEN SATURATION: 96 % | WEIGHT: 116 LBS

## 2023-01-19 PROCEDURE — 99214 OFFICE O/P EST MOD 30 MIN: CPT

## 2023-01-20 ENCOUNTER — APPOINTMENT (OUTPATIENT)
Dept: DERMATOLOGY | Facility: CLINIC | Age: 73
End: 2023-01-20
Payer: MEDICARE

## 2023-01-20 DIAGNOSIS — L30.9 DERMATITIS, UNSPECIFIED: ICD-10-CM

## 2023-01-20 PROCEDURE — 99214 OFFICE O/P EST MOD 30 MIN: CPT

## 2023-01-20 NOTE — ASSESSMENT
[FreeTextEntry1] : # Eczematous dermatitis - chronic condition, well controlled\par - For flares: triamcinolone 0.1% ointment BID PRN up to 2 weeks \par - For maintenance: triamcinolone 0.1% ointment once weekly\par - Continue emollients\par \par # Hyperpigmentation of ankles\par - Related to venous changes of legs\par - Benign, reassured\par \par # Facial hyperpigmentation - chronic condition\par - Favor maturational hyperpigmentation > melasma; no concern for HCQ induced hyperpigmentation\par - Rx skin medicinals HQ 4.5%, tret 0.025, fluocinolone, niacinamide if desired; apply daily for 2 months then break for 2 weeks; pt will consider\par - Photoprotection with sunscreen reviewed\par \par RTC 6 months if starting HQ tx; otherwise yearly

## 2023-01-20 NOTE — HISTORY OF PRESENT ILLNESS
[FreeTextEntry1] : Discoloration - RPA [de-identified] : PCP: SAMSON FLOREZ\par \par BREANNE ROSS is a 72 year F who presents for evaluation of:\par \par Last seen 1 year ago for rash on R anterior lower leg. Well controlled with PRN tac. uses vaseline or moisturizing dry skin cream between flares.\par Also concerned about hyperpigmentation around ankles and on forehead.\par \par PMH: RA, UCTD, hypothyroidism, inflammatory polyarthritis\par Meds: Hydroxychloroquine

## 2023-01-20 NOTE — PHYSICAL EXAM
[Alert] : alert [Oriented x 3] : ~L oriented x 3 [FreeTextEntry3] : Focused exam performed. Findings notable for:\par Hyperpigmented patch on R anterior shin\par Hyperpigmentation on ankles; venous stasis changes\par Hyperpigmentation of forehead and lateral cheeks

## 2023-02-14 ENCOUNTER — APPOINTMENT (OUTPATIENT)
Dept: INTERNAL MEDICINE | Facility: CLINIC | Age: 73
End: 2023-02-14
Payer: MEDICARE

## 2023-02-14 VITALS
HEIGHT: 63 IN | SYSTOLIC BLOOD PRESSURE: 110 MMHG | BODY MASS INDEX: 20.02 KG/M2 | WEIGHT: 113 LBS | OXYGEN SATURATION: 98 % | DIASTOLIC BLOOD PRESSURE: 71 MMHG | HEART RATE: 71 BPM | TEMPERATURE: 97.5 F

## 2023-02-14 PROCEDURE — 99214 OFFICE O/P EST MOD 30 MIN: CPT | Mod: 25

## 2023-02-14 RX ORDER — FLASH GLUCOSE SCANNING READER
EACH MISCELLANEOUS
Qty: 1 | Refills: 0 | Status: DISCONTINUED | COMMUNITY
Start: 2022-10-19 | End: 2023-02-14

## 2023-02-14 RX ORDER — FLASH GLUCOSE SENSOR
KIT MISCELLANEOUS
Qty: 1 | Refills: 0 | Status: DISCONTINUED | COMMUNITY
Start: 2022-10-19 | End: 2023-02-14

## 2023-02-14 NOTE — HEALTH RISK ASSESSMENT
[No] : No [No falls in past year] : Patient reported no falls in the past year [0] : 2) Feeling down, depressed, or hopeless: Not at all (0) [PHQ-2 Negative - No further assessment needed] : PHQ-2 Negative - No further assessment needed [[id number]] : 0

## 2023-02-14 NOTE — PHYSICAL EXAM
[No Acute Distress] : no acute distress [Well Nourished] : well nourished [No JVD] : no jugular venous distention [Supple] : supple [No Respiratory Distress] : no respiratory distress  [Clear to Auscultation] : lungs were clear to auscultation bilaterally [Regular Rhythm] : with a regular rhythm [No Murmur] : no murmur heard [Soft] : abdomen soft [Non Tender] : non-tender [Coordination Grossly Intact] : coordination grossly intact [Normal Gait] : normal gait [Speech Grossly Normal] : speech grossly normal [Normal Mood] : the mood was normal

## 2023-02-14 NOTE — HISTORY OF PRESENT ILLNESS
[de-identified] : Feeling better.  Feels Reclast SE's finally lifted.  Has physical deconditioning with polyarthritis.  Requesting PT referral.  No nausea, vomiting, diarrhea, and constipation. No chest pain or shortness of breath.\par

## 2023-02-19 ENCOUNTER — NON-APPOINTMENT (OUTPATIENT)
Age: 73
End: 2023-02-19

## 2023-02-22 ENCOUNTER — APPOINTMENT (OUTPATIENT)
Dept: PHYSICAL MEDICINE AND REHAB | Facility: CLINIC | Age: 73
End: 2023-02-22
Payer: MEDICARE

## 2023-02-22 VITALS
BODY MASS INDEX: 20.02 KG/M2 | WEIGHT: 113 LBS | HEIGHT: 63 IN | HEART RATE: 92 BPM | DIASTOLIC BLOOD PRESSURE: 75 MMHG | SYSTOLIC BLOOD PRESSURE: 102 MMHG

## 2023-02-22 DIAGNOSIS — G89.29 LOW BACK PAIN, UNSPECIFIED: ICD-10-CM

## 2023-02-22 DIAGNOSIS — M53.80 OTHER SPECIFIED DORSOPATHIES, SITE UNSPECIFIED: ICD-10-CM

## 2023-02-22 DIAGNOSIS — R29.898 OTHER SYMPTOMS AND SIGNS INVOLVING THE MUSCULOSKELETAL SYSTEM: ICD-10-CM

## 2023-02-22 DIAGNOSIS — M25.69 STIFFNESS OF OTHER SPECIFIED JOINT, NOT ELSEWHERE CLASSIFIED: ICD-10-CM

## 2023-02-22 DIAGNOSIS — M54.59 OTHER LOW BACK PAIN: ICD-10-CM

## 2023-02-22 DIAGNOSIS — M54.9 DORSALGIA, UNSPECIFIED: ICD-10-CM

## 2023-02-22 DIAGNOSIS — M54.50 LOW BACK PAIN, UNSPECIFIED: ICD-10-CM

## 2023-02-22 PROCEDURE — 99204 OFFICE O/P NEW MOD 45 MIN: CPT

## 2023-02-22 NOTE — CONSULT LETTER
[FreeTextEntry1] : Dear Dr. SAMSON FLOREZ  , \par \par I had the pleasure of evaluating your patient, BREANNE ROSS .\par \par Thank you very much for allowing me to participate in the care of this patient. If you have any questions, please do not hesitate to contact me. \par \par Sincerely, \par Jono Cosby MD \par \par ABPMR Board Certified in Physical Medicine and Rehabilitation\par Certified Fellow of AANEM (Neuromuscular and Electrodiagnostic Medicine)\par Subspecialty certified in Sports Medicine (ABPMR)\par \par  of Physical Medicine and Rehabilitation\par [facility] [employer] [facility]\par [facility]\par \par

## 2023-02-22 NOTE — HISTORY OF PRESENT ILLNESS
[FreeTextEntry1] : Ms. BREANNE ROSS is a very pleasant 72 year female  with RA and osteoporosis and hypothyroidism who is  seen for evaluation of lower back pains  no radiation  that has been ongoing for a few years   without any specific injury or inciting event. The pain is located primarily a cross lower back intermittent in nature and described as dull  . The pain is rated as 8/10 during today's visit, and ranges from 3-10/10. The patient's symptoms are aggravated by bending or getting up   and alleviated by heat meds . The patient denies any night pain, numbness/tingling, weakness, or bowel/bladder dysfunction. 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

## 2023-02-22 NOTE — PHYSICAL EXAM
[FreeTextEntry1] : PHYSICAL EXAM : OBJECTIVE \par \par GENERAL : Awake ,alert and oriented to time place and person \par HEAD : normocephalic and atraumatic \par NECK : supple ,no tracheal deviation ,no thyroid enlargement noted with swallowing\par EYES : sclera and conjunctiva normal no redness,intact extraocular movements \par ENT  : ears and nose normal in appearance -hearing adequate \par PULMONARY: effort normal. No respiratory distress. breathing regular. No wheezes \par LYMPH : No swelling in limbs, capillary return within normal range \par CVS : warm extremities,no palpitations,not short of breath, no visible jugular venous distention\par PSYCH : mood and affect normal ,good eye contact ,normal attention \par ABDOMEN : no visible distension , \par NEUROLOGICAL:cranial nerves intact,muscle tone normal,gait and balance safe except where noted below \par SKIN : warm and dry No rash detected over specific body areas examined \par MUSCULOSKELETAL: normal muscle bulk, no focal bony tenderness /posture normal except where specified below\par SLR neg \par hip ROM full \par gait NL \par stiff back \par no tenderness with percussion \par FF 40 ext 30 \par no cane\par No long tract signs found on clinical exam and no focal neurological deficits\par

## 2023-02-22 NOTE — REVIEW OF SYSTEMS
[Patient Intake Form Reviewed] : Patient intake form was reviewed [Fever] : no fever [Fatigue] : fatigue [Recent Change In Weight] : ~T no recent weight change [Lower Ext Edema] : no lower extremity edema [Joint Pain] : joint pain [Joint Stiffness] : joint stiffness [Muscle Pain] : muscle pain [Muscle Weakness] : no muscle weakness [Difficulty Walking] : no difficulty walking

## 2023-02-22 NOTE — ASSESSMENT
[FreeTextEntry1] : \par PLAN AND RECOMMENDATIONS :\par \par We discussed differential diagnosis and clinical impression\par advised PT and xrays given her pMH rule out compression fx \par \par Recommend\par .symptomatic care and support\par  medications NSAIDS as needed -  OTC fine (-personal preference )-(once or twice a day), -warned of  possible GI side effects -advised to take with meals or add over the counter Nexium, if sensitive\par \par  imaging as above \par  referral to [facility] \par  hydrotherapy /heat / cold for pain\par  continue  spinal precautions including care with bending, lifting, twisting and  carrying.\par \par  relative rest and avoidance of painful activity where possible \par  increasing activity as discussed \par  return for follow up after imaging \par Total clinician time on the date of this encounter was at least 45 minutes- including\par \par 1 preparing to see the patient and review of prior notes, tests and other records \par 2 obtaining and reviewing and/ or confirming the history\par 3 performing a medically necessary, pertinent  and appropriate examination \par 4 ordering medications and supplements explaining side effects to look for ,tests,procedures,therapy and or specialty referrals\par 5 explaining the diagnosis or differential to the patient \par 6 communicating with other physicians or providers before during or after the visit. note sent to Dr Christian \par \par \par

## 2023-02-23 ENCOUNTER — OUTPATIENT (OUTPATIENT)
Dept: OUTPATIENT SERVICES | Facility: HOSPITAL | Age: 73
LOS: 1 days | End: 2023-02-23
Payer: MEDICARE

## 2023-02-23 ENCOUNTER — APPOINTMENT (OUTPATIENT)
Dept: RADIOLOGY | Facility: CLINIC | Age: 73
End: 2023-02-23

## 2023-02-23 PROCEDURE — 72110 X-RAY EXAM L-2 SPINE 4/>VWS: CPT | Mod: 26

## 2023-02-28 ENCOUNTER — NON-APPOINTMENT (OUTPATIENT)
Age: 73
End: 2023-02-28

## 2023-02-28 LAB
ALBUMIN SERPL ELPH-MCNC: 4.5 G/DL
ALP BLD-CCNC: 67 U/L
ALT SERPL-CCNC: 10 U/L
ANION GAP SERPL CALC-SCNC: 12 MMOL/L
AST SERPL-CCNC: 21 U/L
BASOPHILS # BLD AUTO: 0.02 K/UL
BASOPHILS NFR BLD AUTO: 0.4 %
BILIRUB SERPL-MCNC: 0.3 MG/DL
BUN SERPL-MCNC: 13 MG/DL
CALCIUM SERPL-MCNC: 10 MG/DL
CHLORIDE SERPL-SCNC: 103 MMOL/L
CHOLEST SERPL-MCNC: 180 MG/DL
CO2 SERPL-SCNC: 26 MMOL/L
CREAT SERPL-MCNC: 0.88 MG/DL
CRP SERPL-MCNC: <3 MG/L
EGFR: 71 ML/MIN/1.73M2
EOSINOPHIL # BLD AUTO: 0.1 K/UL
EOSINOPHIL NFR BLD AUTO: 2.1 %
ERYTHROCYTE [SEDIMENTATION RATE] IN BLOOD BY WESTERGREN METHOD: 43 MM/HR
ESTIMATED AVERAGE GLUCOSE: 120 MG/DL
FOLATE SERPL-MCNC: 17.2 NG/ML
GLUCOSE SERPL-MCNC: 84 MG/DL
HBA1C MFR BLD HPLC: 5.8 %
HCT VFR BLD CALC: 39.6 %
HDLC SERPL-MCNC: 69 MG/DL
HGB BLD-MCNC: 12.3 G/DL
IMM GRANULOCYTES NFR BLD AUTO: 0.2 %
LDLC SERPL CALC-MCNC: 99 MG/DL
LYMPHOCYTES # BLD AUTO: 1.17 K/UL
LYMPHOCYTES NFR BLD AUTO: 24.6 %
MAN DIFF?: NORMAL
MCHC RBC-ENTMCNC: 26.8 PG
MCHC RBC-ENTMCNC: 31.1 GM/DL
MCV RBC AUTO: 86.3 FL
MONOCYTES # BLD AUTO: 0.5 K/UL
MONOCYTES NFR BLD AUTO: 10.5 %
NEUTROPHILS # BLD AUTO: 2.96 K/UL
NEUTROPHILS NFR BLD AUTO: 62.2 %
NONHDLC SERPL-MCNC: 111 MG/DL
PLATELET # BLD AUTO: 169 K/UL
POTASSIUM SERPL-SCNC: 4.4 MMOL/L
PROT SERPL-MCNC: 7.8 G/DL
RBC # BLD: 4.59 M/UL
RBC # FLD: 14.4 %
SODIUM SERPL-SCNC: 141 MMOL/L
TRIGL SERPL-MCNC: 62 MG/DL
VIT B12 SERPL-MCNC: 1281 PG/ML
WBC # FLD AUTO: 4.76 K/UL

## 2023-03-27 ENCOUNTER — RX RENEWAL (OUTPATIENT)
Age: 73
End: 2023-03-27

## 2023-03-28 ENCOUNTER — RX RENEWAL (OUTPATIENT)
Age: 73
End: 2023-03-28

## 2023-04-10 ENCOUNTER — APPOINTMENT (OUTPATIENT)
Dept: RHEUMATOLOGY | Facility: CLINIC | Age: 73
End: 2023-04-10
Payer: MEDICARE

## 2023-04-10 VITALS
TEMPERATURE: 98.7 F | OXYGEN SATURATION: 98 % | BODY MASS INDEX: 19.67 KG/M2 | HEART RATE: 73 BPM | HEIGHT: 63 IN | SYSTOLIC BLOOD PRESSURE: 125 MMHG | DIASTOLIC BLOOD PRESSURE: 76 MMHG | WEIGHT: 111 LBS

## 2023-04-10 PROCEDURE — 99213 OFFICE O/P EST LOW 20 MIN: CPT

## 2023-04-10 NOTE — PHYSICAL EXAM
[General Appearance - Alert] : alert [General Appearance - In No Acute Distress] : in no acute distress [Sclera] : the sclera and conjunctiva were normal [Examination Of The Oral Cavity] : the lips and gums were normal [Oropharynx] : the oropharynx was normal [Respiration, Rhythm And Depth] : normal respiratory rhythm and effort [Exaggerated Use Of Accessory Muscles For Inspiration] : no accessory muscle use [Edema] : there was no peripheral edema [Abnormal Walk] : normal gait [Nail Clubbing] : no clubbing  or cyanosis of the fingernails [Musculoskeletal - Swelling] : no joint swelling seen [Motor Tone] : muscle strength and tone were normal [] : no rash [Oriented To Time, Place, And Person] : oriented to person, place, and time [Impaired Insight] : insight and judgment were intact [Affect] : the affect was normal [FreeTextEntry1] : No active synovitis of the upper and lower extremities bilaterally.  Muscle strength intact in all four extremities

## 2023-04-10 NOTE — ASSESSMENT
[FreeTextEntry1] : Inflammatory polyarthritis predominantly of the hands, nonerosive, noted to have positive LEIGHTON, Positive SSa, negative RF, negative CCP.  Patient was seen by ophthalmologist on October 31, 2022, recommended okay to restart hydroxychloroquine, no evidence of hydroxychloroquine toxicity noted.  Since that time patient has resumed hydroxychloroquine 200 mg Monday, Wednesday, and Friday, tolerating well.  Patient currently feeling well, feels some weakness, currently participating in PT and continues home exercises with some improvement to date. Reviewed recent repeat bone density, improvement in the lumbar spine T score noted, endocrinology note appreciated. Reviewed recent labs with patient. Continue calcium and vitamin D supplementation, continue weightbearing exercises, fall precautions discussed.  Patient will continue to follow-up with ophthalmologist as recommended.  Follow-up in 4 months or sooner as needed.\par \par

## 2023-04-10 NOTE — HISTORY OF PRESENT ILLNESS
[FreeTextEntry1] : 73 year old woman returns for follow up rheumatology evaluation.\par \par December 7, 2021\par Patient with onset of hand pain in May 2021, following with Dr. Shiloh Morejon at [place]. \par Patient initially with bilateral hand pain.  Presented with swelling of the bilateral hands, hard to make a fist, hard to open jars or open her door with key.  \par Taking hydroxychloroquine since August 2021 with good response, no pain at present\par Patient also with dry eyes\par \par Patient walks for exercise on track\par Also walks doing errands\par Rec center near home just joined in hopes of using the bicycle\par History of osteoporosis, started on ibandronate, had side effects of jaw pain, discontinues, had xray of the mandible 8/2021 which was negative \par Evaluated by endocrinology, planning for Reclast when insurance approves\par No history of previous fracture\par \par Father with arthritis, RA\par No family history of hip fracture\par \par No photosensitivity\par Uses artificial tears four times per day. Follows with ophthalmologist\par Follows with retina doctor, was seen by retina specialist\par No rash \par \par Review of labs on patient's phone\par LEIGHTON +\par SSa+\par RF negative\par CCP negative\par \par May 2021 \par ESR 74\par CRP 4.26\par \par July 2021\par CRP 50\par ESR 87\par \par September 2021\par ESR 37,\par CRP<0.3\par \par March 7, 2022\par Patient returns for follow up\par Spoke with patient 2/25/2022, patient was concerned about feeling tired and fatigue at that time, and was concerned about possible side effect to plaquenil\par Patient stopped taking medications 2/26/2022 and since that time, feels some improvement in dull headache and maybe some benefit in fatigue\par No pain in the hands, no stiffness or swelling recurred since stopping plaquenil, was taking once daily dosing.\par thyroid function in the normal range at this time.\par No chest pain or shortness of breath\par Does feel some dizziness which has not changed with holding plaquenil.\par \par April 6, 2022\par Patient returns for follow up\par Patient has continued off plaquenil without an increase in joint pains or stiffness\par No pain or swelling at present, previously present in the bilateral hands\par Patient wishes to continue off plaquenil at this time.\par Main concern for patient related to feelings of dizziness and weakness\par Feels may be related to reclast infusion in January 2022\par Reviewed side effects information this morning regarding reclast and feels her symptoms correspond to these symptoms\par Sometimes feels as though may faint or pass out, but able to sit or use an umbrella for balance\par Feels stays well hydrated\par Taking multivitamin and vitamin C\par No changes in thyroid medicine\par Otherwise feeling well\par Has appointment with PMD tomorrow\par Discussed possible cardiology evaluation as also occurred after exercising\par \par June 8, 2022\par Patient returns for follow up\par Feeling better in terms of dizziness since last visit although still present\par Feels benefit from decaf green tea before bed, has been drinking for his week with good response\par Has some increase in joint pain and stiffness, noted to have elevated ESR by PMD\par Was seen by opthalmology recently as well\par Using lidocaine patches for pain, previously tried voltaren gel and di not feel benefit\par Discussed trial of plaquenil 200 mg every other day for now and if develops fatigue again, will discontinue\par \par July 20, 2022\par Patient returns for follow up via telehealth visit.\par Discussed with patient.  You have chosen to receive care through the use of tele-media.  Telemedia enables health care providers at different locations to provide safe, effective, and convenient care through the use of technology.  Please note this is a billable encounter.  Patient understands that I cannot perform a physical exam and that patient may need to come to the clinic to complete the assessment.  Patient agreed verbally to understanding the risks and benefits of telemedia as explained.\par \par Patient has been taking plaquenil about four times per week since last visit.\par "So far so good"\par Feels less foggy than before and feels less pain in the fingers\par No stiffness in the hands noted, maybe one finger intermittently \par Better than before\par Next ophthalmology appointment scheduled for 12/13/2022\par \par PMD appointment 12/5\par  \par October 20, 2022\par On October 11 patient noted a change in vision\par Felt a quarter size decrease in peripheral vision on the right side\par Was seen by retina specialist, completed multiple tests, all normal, no changes noted October 12\par Couple of days developed light sensitivity, wearing sunglasses outside\par Reviewed information regarding hydroxychloroquine\par Was taking Monday Wednesday and Friday 200 mg daily\par Concerned about side effect from\par Last dose yesterday\par Has appointment October 31 with neurologist\par And will follow-up with ophthalmologist on November 14\par No pain at present\par Felt some pain on the right lower back uses patches for pain relief\par Patient requesting new medicine for joint symptoms, very sensitive to medication side effect\par Discussed possible methotrexate 7.5 mg once per week\par Discussed side effect profile with patient\par No pain in the hands wrist shoulders at this time\par No morning stiffness or swelling at this time\par Patient concerned about fluctuations in glucose levels, requesting glucose monitor from primary care provider, in process of prior authorization\par \par January 9, 2023\par Patient returns for follow-up, overall doing well\par Was seen by ophthalmologist October 31, 2022, no evidence of Plaquenil toxicity\par Restarted Plaquenil 200 mg three times per week following ophthalmology evaluation\par Feeling ok\par No joint pain at this time\par Hands are not swollen \par No morning stiffness\par Sometimes some stiffness in the back, feels improvement with menthol patches\par Repeat bone density January 3, 2023, t score -2.7 in the lumbar spine (improved from previous -3.3)\par \par April 10, 2023\par Patient returns for follow up\par Taking plaquenil 200 mg daily three days per week\par Follow up with opthalmology with retina specialist May 15 and June 12th with general ophthalmologist\par Blood pressure well controlled\par Hands are ok, no pain at present\par Started PT for the body, focus on the lower back\par Feeling ok but did not feel as strong as previously

## 2023-04-10 NOTE — DATA REVIEWED
[FreeTextEntry1] : DEXA January 3, 2023\par Lumbar spine T score -2.7\par Left femoral neck T score -0.8\par Left total hip -1.1\par \par DEXA 4/2021\par Lumbar spine T score -3.3\par femoral neck: -1.3\par \par Dexa 2016\par lumbar spine -1.8\par femoral neck -1.2\par \par \par  Xray Lumbosacral Spine 4 Views w/ Flexion and Extension             Final\par \par No Documents Attached\par \par \par \par \par   EXAM: [id number] - XR LS SPINE FLEX EXT MIN 4V  - ORDERED BY: FÁTIMA MARTINEZ\par \par \par PROCEDURE DATE:  02/23/2023\par \par \par \par INTERPRETATION:  Clinical history/reason for exam:Osteoporosis, back pain\par \par Comparison: None.\par \par Procedure: 4 views of the lumbar spine with AP, lateral, flexion, and extension\par \par Findings:\par There are 5 nonrib-bearing lumbar vertebral bodies. The pedicles are visualized. The vertebral body heights are maintained. There is no evidence of dynamic instability on flexion or extension. There is mild disc space narrowing at L5-S1.\par \par Impression:\par No acute fracture or malalignment. Mild degenerative disc disease L5-S1. No evidence of dynamic instability.\par \par --- End of Report ---\par \par \par \par \par \par \par JOSELINE ZUNIGA MD; Attending Radiologist\par This document has been electronically signed. Feb 27 2023  4:48PM\par \par  \par \par  Ordered by: FÁTIMA MARTINEZ       Collected/Examined: 23Feb2023 10:36AM       \par Verified by: FÁTIMA MARTINEZ [id number] 01:06PM       \par  Result Communication: No patient communication needed at this time;\par Stage: Final       \par  Performed at: [facility]       Resulted: [id number] 04:46PM       Last Updated: 28Feb2023 01:06PM       Accession: [id number]

## 2023-05-17 ENCOUNTER — APPOINTMENT (OUTPATIENT)
Dept: INTERNAL MEDICINE | Facility: CLINIC | Age: 73
End: 2023-05-17
Payer: MEDICARE

## 2023-05-17 VITALS
OXYGEN SATURATION: 99 % | HEART RATE: 66 BPM | DIASTOLIC BLOOD PRESSURE: 73 MMHG | SYSTOLIC BLOOD PRESSURE: 113 MMHG | TEMPERATURE: 98 F | BODY MASS INDEX: 19.84 KG/M2 | HEIGHT: 63 IN | WEIGHT: 112 LBS

## 2023-05-17 DIAGNOSIS — R42 DIZZINESS AND GIDDINESS: ICD-10-CM

## 2023-05-17 DIAGNOSIS — Z00.00 ENCOUNTER FOR GENERAL ADULT MEDICAL EXAMINATION W/OUT ABNORMAL FINDINGS: ICD-10-CM

## 2023-05-17 PROCEDURE — 36415 COLL VENOUS BLD VENIPUNCTURE: CPT

## 2023-05-17 PROCEDURE — 99214 OFFICE O/P EST MOD 30 MIN: CPT | Mod: 25

## 2023-05-17 RX ORDER — BLOOD-GLUCOSE METER
W/DEVICE EACH MISCELLANEOUS
Qty: 1 | Refills: 0 | Status: DISCONTINUED | COMMUNITY
Start: 2022-10-18 | End: 2023-05-17

## 2023-05-18 PROBLEM — R42 DISEQUILIBRIUM: Status: ACTIVE | Noted: 2022-04-20

## 2023-05-18 NOTE — PHYSICAL EXAM
[No JVD] : no jugular venous distention [Normal] : normal rate, regular rhythm, normal S1 and S2 and no murmur heard [No Carotid Bruits] : no carotid bruits [No Edema] : there was no peripheral edema [No Rash] : no rash [de-identified] : loss of energy, feeling down

## 2023-05-18 NOTE — REVIEW OF SYSTEMS
[Recent Change In Weight] : ~T recent weight change [Dizziness] : dizziness [Fainting] : fainting [Anxiety] : anxiety [Depression] : depression [Negative] : Integumentary [Pain] : no pain [Vision Problems] : no vision problems [FreeTextEntry2] : transient lightheadedness, intentional felicia loss [de-identified] : lonley and feeling of loss of activities abruptly

## 2023-05-22 ENCOUNTER — TRANSCRIPTION ENCOUNTER (OUTPATIENT)
Age: 73
End: 2023-05-22

## 2023-05-24 ENCOUNTER — TRANSCRIPTION ENCOUNTER (OUTPATIENT)
Age: 73
End: 2023-05-24

## 2023-05-24 LAB
ALBUMIN SERPL ELPH-MCNC: 4.5 G/DL
ALP BLD-CCNC: 75 U/L
ALT SERPL-CCNC: 13 U/L
ANION GAP SERPL CALC-SCNC: 13 MMOL/L
AST SERPL-CCNC: 23 U/L
BASOPHILS # BLD AUTO: 0.01 K/UL
BASOPHILS NFR BLD AUTO: 0.2 %
BILIRUB SERPL-MCNC: 0.4 MG/DL
BUN SERPL-MCNC: 13 MG/DL
CALCIUM SERPL-MCNC: 9.5 MG/DL
CHLORIDE SERPL-SCNC: 102 MMOL/L
CHOLEST SERPL-MCNC: 190 MG/DL
CO2 SERPL-SCNC: 26 MMOL/L
CREAT SERPL-MCNC: 0.83 MG/DL
CRP SERPL-MCNC: 4 MG/L
EGFR: 75 ML/MIN/1.73M2
EOSINOPHIL # BLD AUTO: 0.1 K/UL
EOSINOPHIL NFR BLD AUTO: 2 %
ERYTHROCYTE [SEDIMENTATION RATE] IN BLOOD BY WESTERGREN METHOD: 77 MM/HR
ESTIMATED AVERAGE GLUCOSE: 123 MG/DL
GLUCOSE SERPL-MCNC: 101 MG/DL
HBA1C MFR BLD HPLC: 5.9 %
HCT VFR BLD CALC: 38.5 %
HDLC SERPL-MCNC: 70 MG/DL
HGB BLD-MCNC: 11.9 G/DL
IMM GRANULOCYTES NFR BLD AUTO: 0.2 %
LDLC SERPL CALC-MCNC: 107 MG/DL
LYMPHOCYTES # BLD AUTO: 1.49 K/UL
LYMPHOCYTES NFR BLD AUTO: 29.5 %
MAN DIFF?: NORMAL
MCHC RBC-ENTMCNC: 26.4 PG
MCHC RBC-ENTMCNC: 30.9 GM/DL
MCV RBC AUTO: 85.4 FL
MONOCYTES # BLD AUTO: 0.53 K/UL
MONOCYTES NFR BLD AUTO: 10.5 %
NEUTROPHILS # BLD AUTO: 2.91 K/UL
NEUTROPHILS NFR BLD AUTO: 57.6 %
NONHDLC SERPL-MCNC: 121 MG/DL
PLATELET # BLD AUTO: 176 K/UL
POTASSIUM SERPL-SCNC: 4.4 MMOL/L
PROT SERPL-MCNC: 7.7 G/DL
RBC # BLD: 4.51 M/UL
RBC # FLD: 15.2 %
SODIUM SERPL-SCNC: 140 MMOL/L
TRIGL SERPL-MCNC: 70 MG/DL
TSH SERPL-ACNC: 2.53 UIU/ML
WBC # FLD AUTO: 5.05 K/UL

## 2023-05-25 ENCOUNTER — TRANSCRIPTION ENCOUNTER (OUTPATIENT)
Age: 73
End: 2023-05-25

## 2023-06-20 LAB
25(OH)D3 SERPL-MCNC: 38.8 NG/ML
ALBUMIN SERPL ELPH-MCNC: 4.5 G/DL
CALCIUM SERPL-MCNC: 9.8 MG/DL
CREAT SERPL-MCNC: 0.86 MG/DL
EGFR: 72 ML/MIN/1.73M2
TSH SERPL-ACNC: 3.25 UIU/ML

## 2023-07-07 ENCOUNTER — LABORATORY RESULT (OUTPATIENT)
Age: 73
End: 2023-07-07

## 2023-07-07 ENCOUNTER — APPOINTMENT (OUTPATIENT)
Dept: RHEUMATOLOGY | Facility: CLINIC | Age: 73
End: 2023-07-07
Payer: MEDICARE

## 2023-07-07 VITALS
HEIGHT: 63 IN | TEMPERATURE: 97.6 F | SYSTOLIC BLOOD PRESSURE: 118 MMHG | OXYGEN SATURATION: 98 % | WEIGHT: 117 LBS | BODY MASS INDEX: 20.73 KG/M2 | DIASTOLIC BLOOD PRESSURE: 67 MMHG | HEART RATE: 65 BPM

## 2023-07-07 PROCEDURE — 99213 OFFICE O/P EST LOW 20 MIN: CPT | Mod: 25

## 2023-07-07 PROCEDURE — 36415 COLL VENOUS BLD VENIPUNCTURE: CPT

## 2023-07-07 NOTE — PHYSICAL EXAM
[General Appearance - Alert] : alert [General Appearance - In No Acute Distress] : in no acute distress [General Appearance - Well-Appearing] : healthy appearing [Sclera] : the sclera and conjunctiva were normal [Respiration, Rhythm And Depth] : normal respiratory rhythm and effort [Abnormal Walk] : normal gait [Nail Clubbing] : no clubbing  or cyanosis of the fingernails [Musculoskeletal - Swelling] : no joint swelling seen [Motor Tone] : muscle strength and tone were normal [] : no rash [Oriented To Time, Place, And Person] : oriented to person, place, and time [Impaired Insight] : insight and judgment were intact [Affect] : the affect was normal [FreeTextEntry1] : No active synovitis noted

## 2023-07-07 NOTE — HISTORY OF PRESENT ILLNESS
[FreeTextEntry1] : 73 year old woman returns for follow up rheumatology evaluation.\par \par December 7, 2021\par Patient with onset of hand pain in May 2021, following with Dr. Shiloh Morejon at [place]. \par Patient initially with bilateral hand pain.  Presented with swelling of the bilateral hands, hard to make a fist, hard to open jars or open her door with key.  \par Taking hydroxychloroquine since August 2021 with good response, no pain at present\par Patient also with dry eyes\par \par Patient walks for exercise on track\par Also walks doing errands\par Rec center near home just joined in hopes of using the bicycle\par History of osteoporosis, started on ibandronate, had side effects of jaw pain, discontinues, had xray of the mandible 8/2021 which was negative \par Evaluated by endocrinology, planning for Reclast when insurance approves\par No history of previous fracture\par \par Father with arthritis, RA\par No family history of hip fracture\par \par No photosensitivity\par Uses artificial tears four times per day. Follows with ophthalmologist\par Follows with retina doctor, was seen by retina specialist\par No rash \par \par Review of labs on patient's phone\par LEIGHTON +\par SSa+\par RF negative\par CCP negative\par \par May 2021 \par ESR 74\par CRP 4.26\par \par July 2021\par CRP 50\par ESR 87\par \par September 2021\par ESR 37,\par CRP<0.3\par \par March 7, 2022\par Patient returns for follow up\par Spoke with patient 2/25/2022, patient was concerned about feeling tired and fatigue at that time, and was concerned about possible side effect to plaquenil\par Patient stopped taking medications 2/26/2022 and since that time, feels some improvement in dull headache and maybe some benefit in fatigue\par No pain in the hands, no stiffness or swelling recurred since stopping plaquenil, was taking once daily dosing.\par thyroid function in the normal range at this time.\par No chest pain or shortness of breath\par Does feel some dizziness which has not changed with holding plaquenil.\par \par April 6, 2022\par Patient returns for follow up\par Patient has continued off plaquenil without an increase in joint pains or stiffness\par No pain or swelling at present, previously present in the bilateral hands\par Patient wishes to continue off plaquenil at this time.\par Main concern for patient related to feelings of dizziness and weakness\par Feels may be related to reclast infusion in January 2022\par Reviewed side effects information this morning regarding reclast and feels her symptoms correspond to these symptoms\par Sometimes feels as though may faint or pass out, but able to sit or use an umbrella for balance\par Feels stays well hydrated\par Taking multivitamin and vitamin C\par No changes in thyroid medicine\par Otherwise feeling well\par Has appointment with PMD tomorrow\par Discussed possible cardiology evaluation as also occurred after exercising\par \par June 8, 2022\par Patient returns for follow up\par Feeling better in terms of dizziness since last visit although still present\par Feels benefit from decaf green tea before bed, has been drinking for his week with good response\par Has some increase in joint pain and stiffness, noted to have elevated ESR by PMD\par Was seen by opthalmology recently as well\par Using lidocaine patches for pain, previously tried voltaren gel and di not feel benefit\par Discussed trial of plaquenil 200 mg every other day for now and if develops fatigue again, will discontinue\par \par July 20, 2022\par Patient returns for follow up via telehealth visit.\par Discussed with patient.  You have chosen to receive care through the use of tele-media.  Telemedia enables health care providers at different locations to provide safe, effective, and convenient care through the use of technology.  Please note this is a billable encounter.  Patient understands that I cannot perform a physical exam and that patient may need to come to the clinic to complete the assessment.  Patient agreed verbally to understanding the risks and benefits of telemedia as explained.\par \par Patient has been taking plaquenil about four times per week since last visit.\par "So far so good"\par Feels less foggy than before and feels less pain in the fingers\par No stiffness in the hands noted, maybe one finger intermittently \par Better than before\par Next ophthalmology appointment scheduled for 12/13/2022\par \par PMD appointment 12/5\par  \par October 20, 2022\par On October 11 patient noted a change in vision\par Felt a quarter size decrease in peripheral vision on the right side\par Was seen by retina specialist, completed multiple tests, all normal, no changes noted October 12\par Couple of days developed light sensitivity, wearing sunglasses outside\par Reviewed information regarding hydroxychloroquine\par Was taking Monday Wednesday and Friday 200 mg daily\par Concerned about side effect from\par Last dose yesterday\par Has appointment October 31 with neurologist\par And will follow-up with ophthalmologist on November 14\par No pain at present\par Felt some pain on the right lower back uses patches for pain relief\par Patient requesting new medicine for joint symptoms, very sensitive to medication side effect\par Discussed possible methotrexate 7.5 mg once per week\par Discussed side effect profile with patient\par No pain in the hands wrist shoulders at this time\par No morning stiffness or swelling at this time\par Patient concerned about fluctuations in glucose levels, requesting glucose monitor from primary care provider, in process of prior authorization\par \par January 9, 2023\par Patient returns for follow-up, overall doing well\par Was seen by ophthalmologist October 31, 2022, no evidence of Plaquenil toxicity\par Restarted Plaquenil 200 mg three times per week following ophthalmology evaluation\par Feeling ok\par No joint pain at this time\par Hands are not swollen \par No morning stiffness\par Sometimes some stiffness in the back, feels improvement with menthol patches\par Repeat bone density January 3, 2023, t score -2.7 in the lumbar spine (improved from previous -3.3)\par \par April 10, 2023\par Patient returns for follow up\par Taking plaquenil 200 mg daily three days per week\par Follow up with opthalmology with retina specialist May 15 and June 12th with general ophthalmologist\par Blood pressure well controlled\par Hands are ok, no pain at present\par Started PT for the body, focus on the lower back\par Feeling ok but did not feel as strong as previously\par \par July 7, 2023\par Patient returns for follow up of joint pain\par Patient overall feeling well\par No pain in the joints today, some across the back, does not feel needs patch or pain \par Some pain in the right shoulder when the weather acts up\par Shoulder sometimes becomes more painful with the weather changes\par Follow up with opthalmology with retina specialist May 15 and June 12th with general ophthalmologist, up to date\par Exercises daily either inside the apartment, or doing errands outside\par Recent labs reviewed, concerned about elevated ESR, although CRP in the normal range

## 2023-07-07 NOTE — DATA REVIEWED
[FreeTextEntry1] : DEXA January 3, 2023\par Lumbar spine T score -2.7\par Left femoral neck T score -0.8\par Left total hip -1.1\par \par DEXA 4/2021\par Lumbar spine T score -3.3\par femoral neck: -1.3\par \par Dexa 2016\par lumbar spine -1.8\par femoral neck -1.2\par \par \par  Xray Lumbosacral Spine 4 Views w/ Flexion and Extension             Final\par \par No Documents Attached\par \par \par \par \par   EXAM: [id number] - XR LS SPINE FLEX EXT MIN 4V  - ORDERED BY: FÁTIMA MARTINEZ\par \par \par PROCEDURE DATE:  02/23/2023\par \par \par \par INTERPRETATION:  Clinical history/reason for exam:Osteoporosis, back pain\par \par Comparison: None.\par \par Procedure: 4 views of the lumbar spine with AP, lateral, flexion, and extension\par \par Findings:\par There are 5 nonrib-bearing lumbar vertebral bodies. The pedicles are visualized. The vertebral body heights are maintained. There is no evidence of dynamic instability on flexion or extension. There is mild disc space narrowing at L5-S1.\par \par Impression:\par No acute fracture or malalignment. Mild degenerative disc disease L5-S1. No evidence of dynamic instability.\par \par --- End of Report ---\par \par \par \par \par \par \par JOSELINE ZUNIGA MD; Attending Radiologist\par This document has been electronically signed. Feb 27 2023  4:48PM\par \par  \par \par  Ordered by: FÁTIMA MARTINEZ       Collected/Examined: 23Feb2023 10:36AM       \par Verified by: FÁTIMA MARTINEZ [id number] 01:06PM       \par  Result Communication: No patient communication needed at this time;\par Stage: Final       \par  Performed at: [facility]       Resulted: [id number] 04:46PM       Last Updated: 28Feb2023 01:06PM       Accession: [id number]

## 2023-07-07 NOTE — ASSESSMENT
[FreeTextEntry1] : Inflammatory polyarthritis predominantly of the hands, nonerosive, noted to have positive LEIGHTON, Positive SSa, negative RF, negative CCP.  Patient currently doing well, increased hydroxychloroquine to 200 mg 5 days/week, does not take on Saturdays and Sundays.  Patient was seen by retinologist as well as ophthalmologist in May 2023 and June 2023 without evidence of hydroxychloroquine toxicity noted.  Patient currently feeling well, has greatly adjusted diet to include more anti-inflammatory foods as well as limiting foods with high glycemic index.  Patient has follow-up with endocrinologist pending.  Reviewed recent labs with patient, patient requesting repeat ESR given elevated ESR at last visit, following dietary modifications.  Continue calcium and vitamin D supplementation, continue weightbearing exercises, fall precautions discussed.  Will check labs today in office including ESR, CRP, and repeat SPEP, follow-up in 4 months or sooner as needed.\par \par

## 2023-07-11 LAB
ALBUMIN MFR SERPL ELPH: 52.1 %
ALBUMIN SERPL-MCNC: 3.8 G/DL
ALBUMIN/GLOB SERPL: 1.1 RATIO
ALPHA1 GLOB MFR SERPL ELPH: 4.7 %
ALPHA1 GLOB SERPL ELPH-MCNC: 0.3 G/DL
ALPHA2 GLOB MFR SERPL ELPH: 12.4 %
ALPHA2 GLOB SERPL ELPH-MCNC: 0.9 G/DL
B-GLOBULIN MFR SERPL ELPH: 12.2 %
B-GLOBULIN SERPL ELPH-MCNC: 0.9 G/DL
CRP SERPL-MCNC: 13 MG/L
ERYTHROCYTE [SEDIMENTATION RATE] IN BLOOD BY WESTERGREN METHOD: 108 MM/HR
GAMMA GLOB FLD ELPH-MCNC: 1.4 G/DL
GAMMA GLOB MFR SERPL ELPH: 18.6 %
INTERPRETATION SERPL IEP-IMP: NORMAL
PROT SERPL-MCNC: 7.3 G/DL
PROT SERPL-MCNC: 7.3 G/DL

## 2023-07-18 ENCOUNTER — APPOINTMENT (OUTPATIENT)
Dept: DERMATOLOGY | Facility: CLINIC | Age: 73
End: 2023-07-18
Payer: MEDICARE

## 2023-07-18 PROCEDURE — 99213 OFFICE O/P EST LOW 20 MIN: CPT

## 2023-07-18 NOTE — PHYSICAL EXAM
[Alert] : alert [Oriented x 3] : ~L oriented x 3 [FreeTextEntry3] : Focused exam performed. Findings notable for:\par \par Hyperpigmentation on ankles\par Hyperpigmentation of forehead and lateral cheeks \par

## 2023-07-18 NOTE — HISTORY OF PRESENT ILLNESS
[FreeTextEntry1] : Hyperpigmentation - RPA [de-identified] : Here for follow up of hyperpigmentation around ankles and forehead. \par She did not  skinmedicinals cream due to expense\jhonny Has been using [employer] cream with vitamin C as well as CeraVe mineral sunscreen (only uses a little due to white cast)\jhonny Feels that the pigmentation is improving\jhonny Has not required use to TAC for rash on lower legs

## 2023-07-18 NOTE — ASSESSMENT
[FreeTextEntry1] : # Facial hyperpigmentation - chronic condition\par - Favor maturational hyperpigmentation > melasma; no concern for HCQ induced hyperpigmentation\par - Rx  HQ 4% cream use daily in PM x 3 months; GoodRx coupon provided\par - Skin medicinals cream too expensive\par - Photos taken today to assess if there is clinical improvement with HQ\par - Continue vitamin C cream and sunscreen (may consider switch to tinted mineral or non mineral however she still has a lot left)\par \par # Hyperpigmentation of ankles\par - Related to venous changes of legs\par - May continue OTC vitamin C cream and sunscreen\par \par FU 3 months

## 2023-07-20 ENCOUNTER — NON-APPOINTMENT (OUTPATIENT)
Age: 73
End: 2023-07-20

## 2023-07-20 ENCOUNTER — APPOINTMENT (OUTPATIENT)
Dept: ENDOCRINOLOGY | Facility: CLINIC | Age: 73
End: 2023-07-20
Payer: MEDICARE

## 2023-07-20 VITALS
HEART RATE: 62 BPM | DIASTOLIC BLOOD PRESSURE: 74 MMHG | WEIGHT: 110 LBS | SYSTOLIC BLOOD PRESSURE: 114 MMHG | TEMPERATURE: 96.9 F | HEIGHT: 63 IN | BODY MASS INDEX: 19.49 KG/M2 | OXYGEN SATURATION: 99 %

## 2023-07-20 DIAGNOSIS — E55.9 VITAMIN D DEFICIENCY, UNSPECIFIED: ICD-10-CM

## 2023-07-20 PROCEDURE — 99214 OFFICE O/P EST MOD 30 MIN: CPT

## 2023-07-20 RX ORDER — LEVOTHYROXINE SODIUM 0.05 MG/1
50 TABLET ORAL
Qty: 78 | Refills: 2 | Status: ACTIVE | COMMUNITY
Start: 2021-10-11 | End: 1900-01-01

## 2023-08-10 ENCOUNTER — APPOINTMENT (OUTPATIENT)
Dept: INTERNAL MEDICINE | Facility: CLINIC | Age: 73
End: 2023-08-10
Payer: MEDICARE

## 2023-08-10 VITALS
HEART RATE: 86 BPM | SYSTOLIC BLOOD PRESSURE: 121 MMHG | TEMPERATURE: 97.2 F | DIASTOLIC BLOOD PRESSURE: 73 MMHG | BODY MASS INDEX: 19.49 KG/M2 | WEIGHT: 110 LBS | HEIGHT: 63 IN | OXYGEN SATURATION: 99 %

## 2023-08-10 DIAGNOSIS — R79.89 OTHER SPECIFIED ABNORMAL FINDINGS OF BLOOD CHEMISTRY: ICD-10-CM

## 2023-08-10 PROCEDURE — 99214 OFFICE O/P EST MOD 30 MIN: CPT

## 2023-08-10 RX ORDER — B-COMPLEX WITH VITAMIN C
500-200 TABLET ORAL
Qty: 90 | Refills: 3 | Status: DISCONTINUED | COMMUNITY
End: 2023-08-10

## 2023-08-10 RX ORDER — EMOLLIENT BASE
CREAM (GRAM) TOPICAL
Qty: 1 | Refills: 11 | Status: DISCONTINUED | COMMUNITY
Start: 2022-01-18 | End: 2023-08-10

## 2023-08-10 RX ORDER — CHLORHEXIDINE GLUCONATE 4 %
LIQUID (ML) TOPICAL DAILY
Qty: 100 | Refills: 2 | Status: DISCONTINUED | COMMUNITY
Start: 2021-12-14 | End: 2023-08-10

## 2023-08-10 RX ORDER — OYSTER SHELL CALCIUM WITH VITAMIN D 500; 200 MG/1; [IU]/1
500-5 TABLET, FILM COATED ORAL DAILY
Qty: 90 | Refills: 0 | Status: DISCONTINUED | COMMUNITY
Start: 2022-05-18 | End: 2023-08-10

## 2023-08-10 NOTE — HISTORY OF PRESENT ILLNESS
[FreeTextEntry1] : Follow up  [de-identified] : Patient reports that she has been feeling anxious. She increased her Plaquenil and is concerned about the side effects of anxiety, increased HR and increased urination. But she has also increased her water intake. No nausea, vomiting, diarrhea, and constipation. No chest pain or shortness of breath.

## 2023-08-10 NOTE — PLAN
[FreeTextEntry1] : SSRI.  Possible side effects and possible adverse reactions discussed at length with the patients.  All patient's questions addressed pertaining to medication issues. 32 min on Hx, exam, anxiety hx (extensive), treatment options, med, se and f/u  RTO 4 weeks

## 2023-08-10 NOTE — PHYSICAL EXAM
[No Acute Distress] : no acute distress [Well Nourished] : well nourished [Normal Sclera/Conjunctiva] : normal sclera/conjunctiva [Coordination Grossly Intact] : coordination grossly intact [Normal Gait] : normal gait [Speech Grossly Normal] : speech grossly normal [Normal Mood] : the mood was normal

## 2023-09-07 ENCOUNTER — APPOINTMENT (OUTPATIENT)
Dept: INTERNAL MEDICINE | Facility: CLINIC | Age: 73
End: 2023-09-07
Payer: MEDICARE

## 2023-09-07 VITALS
WEIGHT: 109 LBS | RESPIRATION RATE: 14 BRPM | TEMPERATURE: 98.1 F | BODY MASS INDEX: 19.31 KG/M2 | OXYGEN SATURATION: 99 % | HEART RATE: 84 BPM | HEIGHT: 63 IN | SYSTOLIC BLOOD PRESSURE: 120 MMHG | DIASTOLIC BLOOD PRESSURE: 79 MMHG

## 2023-09-07 DIAGNOSIS — R73.09 OTHER ABNORMAL GLUCOSE: ICD-10-CM

## 2023-09-07 DIAGNOSIS — T50.905A ADVERSE EFFECT OF UNSPECIFIED DRUGS, MEDICAMENTS AND BIOLOGICAL SUBSTANCES, INITIAL ENCOUNTER: ICD-10-CM

## 2023-09-07 DIAGNOSIS — R53.81 OTHER MALAISE: ICD-10-CM

## 2023-09-07 DIAGNOSIS — E03.8 OTHER SPECIFIED HYPOTHYROIDISM: ICD-10-CM

## 2023-09-07 DIAGNOSIS — E06.3 OTHER SPECIFIED HYPOTHYROIDISM: ICD-10-CM

## 2023-09-07 DIAGNOSIS — F41.9 ANXIETY DISORDER, UNSPECIFIED: ICD-10-CM

## 2023-09-07 PROCEDURE — 99214 OFFICE O/P EST MOD 30 MIN: CPT | Mod: 25

## 2023-09-07 PROCEDURE — 36415 COLL VENOUS BLD VENIPUNCTURE: CPT

## 2023-09-07 RX ORDER — HYDROXYZINE HYDROCHLORIDE 10 MG/1
10 TABLET ORAL
Qty: 90 | Refills: 0 | Status: DISCONTINUED | COMMUNITY
Start: 2022-06-01 | End: 2023-09-07

## 2023-09-07 RX ORDER — TRIAMCINOLONE ACETONIDE 1 MG/G
0.1 OINTMENT TOPICAL
Qty: 80 | Refills: 0 | Status: DISCONTINUED | COMMUNITY
Start: 2022-01-18 | End: 2023-09-07

## 2023-09-07 NOTE — HISTORY OF PRESENT ILLNESS
[FreeTextEntry1] : Anxiety [de-identified] : Pt took Lexapro a little less than 4 weeks.  Hasn't taken for 3-4 days.  Patient states that she got crusty buildup around her eyes especially in the morning.  She felt that 1 pupil became larger than the other which resolved towards the afternoon and end of the day.  Patient has produced a number of documents from the pharmacy with an extensive list on side effects from all her medications.

## 2023-09-07 NOTE — REVIEW OF SYSTEMS
[Fever] : no fever [Night Sweats] : no night sweats [Shortness Of Breath] : no shortness of breath [Cough] : no cough [Nausea] : no nausea [Vomiting] : no vomiting

## 2023-09-07 NOTE — PLAN
[FreeTextEntry1] : 32 minutes spent discussing with the patient on the history, review of patient's complaint of side effects, the physiology of the anticholinergic effects of SSRIs and their effect on the eyes and its transient nature, current management and future management.  Follow-up in 3 months

## 2023-09-12 LAB
ALBUMIN SERPL ELPH-MCNC: 4.6 G/DL
ALP BLD-CCNC: 65 U/L
ALT SERPL-CCNC: 9 U/L
ANION GAP SERPL CALC-SCNC: 14 MMOL/L
AST SERPL-CCNC: 18 U/L
BASOPHILS # BLD AUTO: 0.01 K/UL
BASOPHILS NFR BLD AUTO: 0.2 %
BILIRUB SERPL-MCNC: 0.2 MG/DL
BUN SERPL-MCNC: 14 MG/DL
CALCIUM SERPL-MCNC: 9.5 MG/DL
CHLORIDE SERPL-SCNC: 105 MMOL/L
CHOLEST SERPL-MCNC: 154 MG/DL
CO2 SERPL-SCNC: 25 MMOL/L
CREAT SERPL-MCNC: 0.94 MG/DL
EGFR: 65 ML/MIN/1.73M2
EOSINOPHIL # BLD AUTO: 0.07 K/UL
EOSINOPHIL NFR BLD AUTO: 1.4 %
GLUCOSE SERPL-MCNC: 102 MG/DL
HCT VFR BLD CALC: 38.4 %
HDLC SERPL-MCNC: 73 MG/DL
HGB BLD-MCNC: 11.7 G/DL
IMM GRANULOCYTES NFR BLD AUTO: 0.4 %
LDLC SERPL CALC-MCNC: 71 MG/DL
LYMPHOCYTES # BLD AUTO: 0.97 K/UL
LYMPHOCYTES NFR BLD AUTO: 19.7 %
MAN DIFF?: NORMAL
MCHC RBC-ENTMCNC: 26.3 PG
MCHC RBC-ENTMCNC: 30.5 GM/DL
MCV RBC AUTO: 86.3 FL
MONOCYTES # BLD AUTO: 0.48 K/UL
MONOCYTES NFR BLD AUTO: 9.7 %
NEUTROPHILS # BLD AUTO: 3.38 K/UL
NEUTROPHILS NFR BLD AUTO: 68.6 %
NONHDLC SERPL-MCNC: 81 MG/DL
PLATELET # BLD AUTO: 182 K/UL
POTASSIUM SERPL-SCNC: 4.2 MMOL/L
PROT SERPL-MCNC: 7.6 G/DL
RBC # BLD: 4.45 M/UL
RBC # FLD: 14.5 %
SODIUM SERPL-SCNC: 145 MMOL/L
TRIGL SERPL-MCNC: 45 MG/DL
TSH SERPL-ACNC: 1.5 UIU/ML
WBC # FLD AUTO: 4.93 K/UL

## 2023-09-29 ENCOUNTER — RX RENEWAL (OUTPATIENT)
Age: 73
End: 2023-09-29

## 2023-10-09 ENCOUNTER — APPOINTMENT (OUTPATIENT)
Dept: RHEUMATOLOGY | Facility: CLINIC | Age: 73
End: 2023-10-09
Payer: MEDICARE

## 2023-10-09 VITALS
HEIGHT: 63 IN | OXYGEN SATURATION: 96 % | SYSTOLIC BLOOD PRESSURE: 121 MMHG | DIASTOLIC BLOOD PRESSURE: 78 MMHG | HEART RATE: 72 BPM | WEIGHT: 107 LBS | TEMPERATURE: 97.2 F | BODY MASS INDEX: 18.96 KG/M2

## 2023-10-09 DIAGNOSIS — M06.9 RHEUMATOID ARTHRITIS, UNSPECIFIED: ICD-10-CM

## 2023-10-09 DIAGNOSIS — M81.0 AGE-RELATED OSTEOPOROSIS W/OUT CURRENT PATHOLOGICAL FRACTURE: ICD-10-CM

## 2023-10-09 DIAGNOSIS — M35.9 SYSTEMIC INVOLVEMENT OF CONNECTIVE TISSUE, UNSPECIFIED: ICD-10-CM

## 2023-10-09 DIAGNOSIS — M06.4 INFLAMMATORY POLYARTHROPATHY: ICD-10-CM

## 2023-10-09 PROCEDURE — 36415 COLL VENOUS BLD VENIPUNCTURE: CPT

## 2023-10-09 PROCEDURE — 99214 OFFICE O/P EST MOD 30 MIN: CPT | Mod: 25

## 2023-10-10 LAB
CRP SERPL-MCNC: <3 MG/L
ERYTHROCYTE [SEDIMENTATION RATE] IN BLOOD BY WESTERGREN METHOD: 67 MM/HR

## 2023-10-17 ENCOUNTER — APPOINTMENT (OUTPATIENT)
Dept: DERMATOLOGY | Facility: CLINIC | Age: 73
End: 2023-10-17
Payer: MEDICARE

## 2023-10-17 DIAGNOSIS — L81.9 DISORDER OF PIGMENTATION, UNSPECIFIED: ICD-10-CM

## 2023-10-17 PROCEDURE — 99213 OFFICE O/P EST LOW 20 MIN: CPT

## 2023-10-17 RX ORDER — HYDROQUINONE 40 MG/G
4 CREAM TOPICAL
Qty: 1 | Refills: 2 | Status: ACTIVE | COMMUNITY
Start: 2023-07-18 | End: 1900-01-01

## 2023-11-08 ENCOUNTER — RX RENEWAL (OUTPATIENT)
Age: 73
End: 2023-11-08

## 2023-11-08 RX ORDER — ESCITALOPRAM OXALATE 5 MG/1
5 TABLET ORAL DAILY
Qty: 30 | Refills: 2 | Status: ACTIVE | COMMUNITY
Start: 2023-08-10 | End: 1900-01-01

## 2023-12-07 ENCOUNTER — APPOINTMENT (OUTPATIENT)
Dept: INTERNAL MEDICINE | Facility: CLINIC | Age: 73
End: 2023-12-07

## 2024-01-05 ENCOUNTER — RX RENEWAL (OUTPATIENT)
Age: 74
End: 2024-01-05

## 2024-01-05 NOTE — ED PROVIDER NOTE - OBJECTIVE STATEMENT
Addended by: ALPESH CLARK on: 1/5/2024 02:29 PM     Modules accepted: Level of Service     72 yo F with hx of breast cancer in 2008, resolved, RA presents with weakness, fatigue, pre-syncope and palpitations worsening for the last 2 months. Reports presyncope at least once per week with preceding warm sensation and palpitations, but no chest pain, sob or diaphoresis.  No actual syncope or head trauma. No hx of thyroid or cardiac disease. No leg edema, hx of DVTs or hormonal use. 70 yo F with hx of breast cancer in 2008, resolved, RA presents with weakness, fatigue, pre-syncope and palpitations worsening for the last 2 months. Reports presyncope at least once per week with preceding warm sensation and palpitations, but no chest pain, sob or diaphoresis.  No actual syncope or head trauma. No hx of thyroid or cardiac disease. No leg edema, hx of DVTs or hormonal use. Symptoms worsened in June after starting hydroxychlorquine and Ibandronate.

## 2024-01-09 ENCOUNTER — APPOINTMENT (OUTPATIENT)
Dept: RHEUMATOLOGY | Facility: CLINIC | Age: 74
End: 2024-01-09

## 2024-01-18 ENCOUNTER — APPOINTMENT (OUTPATIENT)
Dept: ENDOCRINOLOGY | Facility: CLINIC | Age: 74
End: 2024-01-18

## 2024-02-20 ENCOUNTER — RX RENEWAL (OUTPATIENT)
Age: 74
End: 2024-02-20

## 2024-02-20 RX ORDER — CALCIUM CARBONATE-VITAMIN D TAB 500 MG-200 UNIT 500-200 MG-UNIT
500-5 TAB ORAL
Qty: 90 | Refills: 3 | Status: ACTIVE | COMMUNITY
Start: 2023-03-27 | End: 1900-01-01

## 2024-04-24 ENCOUNTER — RX RENEWAL (OUTPATIENT)
Age: 74
End: 2024-04-24

## 2024-04-24 RX ORDER — MULTIVITAMIN WITH FOLIC ACID 400 MCG
TABLET ORAL
Qty: 100 | Refills: 1 | Status: ACTIVE | COMMUNITY
Start: 2023-03-28 | End: 1900-01-01

## 2024-06-26 ENCOUNTER — RX RENEWAL (OUTPATIENT)
Age: 74
End: 2024-06-26

## 2024-06-26 RX ORDER — HYDROXYCHLOROQUINE SULFATE 200 MG/1
200 TABLET, FILM COATED ORAL
Qty: 90 | Refills: 0 | Status: ACTIVE | COMMUNITY
Start: 1900-01-01 | End: 1900-01-01

## 2024-08-03 PROBLEM — E06.3 HYPOTHYROIDISM DUE TO HASHIMOTO'S THYROIDITIS: Status: ACTIVE | Noted: 2021-09-07

## 2025-04-10 ENCOUNTER — EMERGENCY (EMERGENCY)
Facility: HOSPITAL | Age: 75
LOS: 1 days | End: 2025-04-10
Attending: EMERGENCY MEDICINE | Admitting: EMERGENCY MEDICINE
Payer: MEDICARE

## 2025-04-10 VITALS
RESPIRATION RATE: 15 BRPM | OXYGEN SATURATION: 98 % | SYSTOLIC BLOOD PRESSURE: 107 MMHG | DIASTOLIC BLOOD PRESSURE: 69 MMHG | TEMPERATURE: 98 F | HEART RATE: 94 BPM

## 2025-04-10 LAB
BASOPHILS # BLD AUTO: 0.01 K/UL — SIGNIFICANT CHANGE UP (ref 0–0.2)
BASOPHILS NFR BLD AUTO: 0.2 % — SIGNIFICANT CHANGE UP (ref 0–2)
EOSINOPHIL # BLD AUTO: 0.09 K/UL — SIGNIFICANT CHANGE UP (ref 0–0.5)
EOSINOPHIL NFR BLD AUTO: 1.7 % — SIGNIFICANT CHANGE UP (ref 0–6)
HCT VFR BLD CALC: 36.3 % — SIGNIFICANT CHANGE UP (ref 34.5–45)
HGB BLD-MCNC: 11.7 G/DL — SIGNIFICANT CHANGE UP (ref 11.5–15.5)
IMM GRANULOCYTES # BLD AUTO: 0 K/UL — SIGNIFICANT CHANGE UP (ref 0–0.07)
IMM GRANULOCYTES NFR BLD AUTO: 0 % — SIGNIFICANT CHANGE UP (ref 0–0.9)
LYMPHOCYTES # BLD AUTO: 1.08 K/UL — SIGNIFICANT CHANGE UP (ref 1–3.3)
LYMPHOCYTES NFR BLD AUTO: 20 % — SIGNIFICANT CHANGE UP (ref 13–44)
MCHC RBC-ENTMCNC: 26.4 PG — LOW (ref 27–34)
MCHC RBC-ENTMCNC: 32.2 G/DL — SIGNIFICANT CHANGE UP (ref 32–36)
MCV RBC AUTO: 81.9 FL — SIGNIFICANT CHANGE UP (ref 80–100)
MONOCYTES # BLD AUTO: 0.67 K/UL — SIGNIFICANT CHANGE UP (ref 0–0.9)
MONOCYTES NFR BLD AUTO: 12.4 % — SIGNIFICANT CHANGE UP (ref 2–14)
NEUTROPHILS # BLD AUTO: 3.56 K/UL — SIGNIFICANT CHANGE UP (ref 1.8–7.4)
NEUTROPHILS NFR BLD AUTO: 65.7 % — SIGNIFICANT CHANGE UP (ref 43–77)
NRBC # BLD AUTO: 0 K/UL — SIGNIFICANT CHANGE UP (ref 0–0)
NRBC # FLD: 0 K/UL — SIGNIFICANT CHANGE UP (ref 0–0)
NRBC BLD AUTO-RTO: 0 /100 WBCS — SIGNIFICANT CHANGE UP (ref 0–0)
PLATELET # BLD AUTO: 179 K/UL — SIGNIFICANT CHANGE UP (ref 150–400)
PMV BLD: 10.7 FL — SIGNIFICANT CHANGE UP (ref 7–13)
RBC # BLD: 4.43 M/UL — SIGNIFICANT CHANGE UP (ref 3.8–5.2)
RBC # FLD: 14.1 % — SIGNIFICANT CHANGE UP (ref 10.3–14.5)
WBC # BLD: 5.41 K/UL — SIGNIFICANT CHANGE UP (ref 3.8–10.5)
WBC # FLD AUTO: 5.41 K/UL — SIGNIFICANT CHANGE UP (ref 3.8–10.5)

## 2025-04-10 PROCEDURE — 99283 EMERGENCY DEPT VISIT LOW MDM: CPT

## 2025-04-10 RX ADMIN — Medication 2 SPRAY(S): at 21:14

## 2025-04-10 NOTE — ED PROVIDER NOTE - PATIENT PORTAL LINK FT
You can access the FollowMyHealth Patient Portal offered by [facility] by registering at the following website: [email or web address]. By joining [facility]’s FollowMyHealth portal, you will also be able to view your health information using other applications (apps) compatible with our system.

## 2025-04-10 NOTE — ED PROVIDER NOTE - ATTENDING CONTRIBUTION TO CARE
75-year-old female with a medical history of rheumatoid arthritis and osteoporosis, who arrived at the emergency department via EMS for epistaxis originating from the right nostril. She experienced bleeding for approximately 30 minutes prior to EMS arrival, noting it was heavier for about 10 minutes before improving. There were no known triggers, and she denies being on blood thinners. She has had a similar episode 3 weeks ago. Upon arrival, her bleeding was lessened with one instance of blood moving down the throat, without symptoms such as lightheadedness, dizziness, fatigue, chest pain, or shortness of breath. Physical examination revealed no active bleeding at the nostrils with some blood in the oropharynx but no active dripping. Vital signs were stable. Her care involved administering Afrin nasal spray and assessment through a CBC which showed mostly normal results except for low MCH. She was discharged with instructions to manage and prevent further nosebleeds, including using saline and Vaseline for nasal moisture and avoiding nasal irritants.    I saw and evaluated the patient. I discussed the case with the resident and agree with the findings and helped develop the plan of care as documented in the resident's note. I agree with the findings and plan of care as documented in the resident's note.

## 2025-04-10 NOTE — ED PROVIDER NOTE - NS ED ATTENDING STATEMENT MOD
I have seen and examined this patient and fully participated in the care of this patient as the teaching attending.  The service was shared with the KARINA.  I reviewed and verified the documentation.

## 2025-04-10 NOTE — ED ADULT NURSE NOTE - OBJECTIVE STATEMENT
Abdominal Pain, N/V/D 75y female presents to ED c/o epistaxis to right nare. Denies recent head or facial trauma.

## 2025-04-10 NOTE — ED PROVIDER NOTE - NSFOLLOWUPINSTRUCTIONS_ED_ALL_ED_FT
-Vasoline application via q-tip to each nare twice daily  -Saline sprays 2 sprays each nare four times per day; if possible, may try ayr saline nasogel spray instead (Can buy at [facility])  -Afrin BID to bilateral nares x 3 days only  -If you rebleed, spray copious afrin and hold alar pressure for 15 minutes  -No significant straining for next couple days  -If significant bleeding, return to ED    Nosebleed    WHAT YOU NEED TO KNOW:    A nosebleed, or epistaxis, occurs when one or more of the blood vessels in your nose break. You may have dark or bright red blood from one or both nostrils. A nosebleed is most commonly caused by dry air or picking your nose. A direct blow to your nose, irritation from a cold or allergies, or a foreign object can also cause a nosebleed.     DISCHARGE INSTRUCTIONS:    Return to the emergency department if:     Your nasal packing is soaked with blood.  Your nose is still bleeding after 20 minutes, even after you pinch it.   You have a foul-smelling discharge coming out of your nose.  You feel so weak and dizzy that you have trouble standing up.  You have trouble breathing or talking.     Contact your healthcare provider if:     You have a fever and are vomiting.  You have pain in and around your nose that is getting worse even after you take pain medicines.  Your nasal pack is loose.  You have questions or concerns about your condition or care.    First aid:     Sit up and lean forward. This will help prevent you from swallowing blood. Spit blood and saliva into a bowl.     Apply pressure to your nose. Use 2 fingers to pinch your nose shut for 10 to 15 minutes. This will help stop the bleeding. Breathe through your mouth.      Apply ice on the bridge of your nose to decrease swelling and bleeding. Use a cold pack or put crushed ice in a plastic bag. Cover it with a towel to protect your skin.    Pack your nose with a cotton ball, tissue, tampon, or gauze bandage to stop the bleeding.    Medicines:     Medicines applied to a small piece of cotton and placed in your nose. Medicine may also be sprayed in or applied directly to your nose. You may need medicine to prevent an infection. If bleeding is severe, medicine may be injected into a blood vessel in your nose.     Take your medicine as directed. Contact your healthcare provider if you think your medicine is not helping or if you have side effects. Tell him of her if you are allergic to any medicine. Keep a list of the medicines, vitamins, and herbs you take. Include the amounts, and when and why you take them. Bring the list or the pill bottles to follow-up visits. Carry your medicine list with you in case of an emergency.    Prevent another nosebleed:     Keep your nose moist. Put a small amount of petroleum jelly inside your nostrils as needed. Use a saline (saltwater) nasal spray. Do not put anything else inside your nose unless your healthcare provider says it is okay. Do not use oil-based lubricants if you use oxygen therapy. They may be flammable.    Use a cool mist humidifier to increase air moisture in your home. This will help your nose stay moist.     Do not pick or blow your nose for at least a week. You can irritate or damage your nose if you pick it. Blowing your nose too hard may cause the bleeding to start again. Do not bend over or strain as this can cause the bleeding to start again.    Avoid irritants such as tobacco smoke or chemical sprays such as .    Follow up with your healthcare provider as directed: Any packing in your nose should be removed within 2 to 3 days. Write down your questions so you remember to ask them during your visits.        © Copyright [employer] 2019 All illustrations and images included in CareNotes are the copyrighted property of [employer] or [employer].

## 2025-04-10 NOTE — ED PROVIDER NOTE - PHYSICAL EXAMINATION
Const: not in acute distress  Eyes: no conjunctival injection  HEENT: Head NCAT, Moist MM. No active bleeding from bilateral nares.   Neck: Trachea midline.  Some blood noted in posterior oropharynx, but no active dripping.  CVS: +S1/S2, No murmurs or gallops  RESP: Unlabored respiratory effort. Clear to auscultation bilaterally.  GI: Nontender/Nondistended, No CVA tenderness b/l.   Skin: Intact.   Neuro: moving all four extremities  Psych: Awake, Alert, & Cooperative

## 2025-04-10 NOTE — ED PROVIDER NOTE - OBJECTIVE STATEMENT
75 year old Female with PMhx rheumatoid arthritis, osteoporosis, presenting to the emergency department via EMS for epistaxis.  Reports bleeding arising from R nare.  Patient states that she was have epistaxis for approximately 30 minutes prior to EMS arrival. No preceding triggers. Patient reports heavier flow for around 10 minutes, before improving.  Patient denies being on blood thinners.  Of note, patient had a similar nose bleed 3 weeks ago.  Patient states bleeding has improved since arrival to ED. Reports one episode of blood down throat.  Denies lightheadedness, dizziness, fatigue, chest pain, shortness of breath.

## 2025-04-10 NOTE — ED PROVIDER NOTE - CLINICAL SUMMARY MEDICAL DECISION MAKING FREE TEXT BOX
75 year old Female with PMhx rheumatoid arthritis, osteoporosis, presenting to the emergency department via EMS for epistaxis.  Vitals nonactionable.  Physical exam with no active bleeding from bilateral nares, some blood noted in posterior oropharynx, but no active dripping.  Will check CBC and administer afrin.

## 2025-04-10 NOTE — ED ADULT TRIAGE NOTE - CHIEF COMPLAINT QUOTE
Pt brought in by EMS with complaint of a nose bleed that started 30 min prior to EMS on the right nare. Bleeding resolved upon arrival, states her apartment is very warm. Denies anticoagulant use.

## 2025-04-14 DIAGNOSIS — R04.0 EPISTAXIS: ICD-10-CM

## 2025-04-14 DIAGNOSIS — M06.9 RHEUMATOID ARTHRITIS, UNSPECIFIED: ICD-10-CM

## 2025-04-14 DIAGNOSIS — M81.0 AGE-RELATED OSTEOPOROSIS WITHOUT CURRENT PATHOLOGICAL FRACTURE: ICD-10-CM
